# Patient Record
Sex: MALE | Race: WHITE | Employment: OTHER | URBAN - METROPOLITAN AREA
[De-identification: names, ages, dates, MRNs, and addresses within clinical notes are randomized per-mention and may not be internally consistent; named-entity substitution may affect disease eponyms.]

---

## 2017-05-05 ENCOUNTER — HOSPITAL ENCOUNTER (OUTPATIENT)
Dept: CT IMAGING | Age: 62
Discharge: HOME | End: 2017-05-05
Attending: INTERNAL MEDICINE | Admitting: INTERNAL MEDICINE

## 2017-05-05 DIAGNOSIS — R93.5 ABNORMAL ABDOMINAL ULTRASOUND: ICD-10-CM

## 2017-05-05 DIAGNOSIS — R93.5 ABNORMAL FINDINGS ON DIAGNOSTIC IMAGING OF OTHER ABDOMINAL REGIONS, INCLUDING RETROPERITONEUM: ICD-10-CM

## 2017-06-04 PROBLEM — J18.9 PNEUMONIA: Status: ACTIVE | Noted: 2017-06-04

## 2017-09-27 ENCOUNTER — HOSPITAL ENCOUNTER (OUTPATIENT)
Dept: SURGERY | Age: 62
Discharge: HOME | End: 2017-09-27
Attending: INTERNAL MEDICINE | Admitting: INTERNAL MEDICINE

## 2017-09-27 VITALS
BODY MASS INDEX: 31.92 KG/M2 | HEIGHT: 70 IN | HEART RATE: 76 BPM | SYSTOLIC BLOOD PRESSURE: 112 MMHG | WEIGHT: 223 LBS | RESPIRATION RATE: 18 BRPM | DIASTOLIC BLOOD PRESSURE: 74 MMHG | TEMPERATURE: 97.7 F | OXYGEN SATURATION: 94 %

## 2019-04-13 ENCOUNTER — APPOINTMENT (OUTPATIENT)
Dept: GENERAL RADIOLOGY | Age: 64
DRG: 469 | End: 2019-04-13
Payer: MEDICAID

## 2019-04-13 ENCOUNTER — HOSPITAL ENCOUNTER (INPATIENT)
Age: 64
LOS: 7 days | Discharge: NURSING, REHAB OR LONG-TERM CARE | DRG: 469 | End: 2019-04-20
Attending: EMERGENCY MEDICINE | Admitting: FAMILY MEDICINE
Payer: MEDICAID

## 2019-04-13 DIAGNOSIS — N17.9 AKI (ACUTE KIDNEY INJURY) (HCC): ICD-10-CM

## 2019-04-13 DIAGNOSIS — S72.002A CLOSED FRACTURE OF LEFT HIP, INITIAL ENCOUNTER (HCC): Primary | ICD-10-CM

## 2019-04-13 PROBLEM — N39.0 UTI (URINARY TRACT INFECTION): Status: ACTIVE | Noted: 2019-04-13

## 2019-04-13 LAB
ANION GAP SERPL CALCULATED.3IONS-SCNC: 12 MMOL/L (ref 3–16)
APTT: 28.2 SEC (ref 26–36)
BACTERIA: ABNORMAL /HPF
BILIRUBIN URINE: NEGATIVE
BLOOD, URINE: ABNORMAL
BUN BLDV-MCNC: 31 MG/DL (ref 7–20)
CALCIUM SERPL-MCNC: 8.7 MG/DL (ref 8.3–10.6)
CHLORIDE BLD-SCNC: 103 MMOL/L (ref 99–110)
CLARITY: ABNORMAL
CO2: 21 MMOL/L (ref 21–32)
COLOR: YELLOW
COMMENT UA: ABNORMAL
CREAT SERPL-MCNC: 4.2 MG/DL (ref 0.8–1.3)
EPITHELIAL CELLS, UA: ABNORMAL /HPF
GFR AFRICAN AMERICAN: 17
GFR NON-AFRICAN AMERICAN: 14
GLUCOSE BLD-MCNC: 105 MG/DL (ref 70–99)
GLUCOSE URINE: NEGATIVE MG/DL
HCT VFR BLD CALC: 30.8 % (ref 40.5–52.5)
HEMOGLOBIN: 9.9 G/DL (ref 13.5–17.5)
INR BLD: 1.26 (ref 0.86–1.14)
KETONES, URINE: NEGATIVE MG/DL
LACTIC ACID, SEPSIS: 0.7 MMOL/L (ref 0.4–1.9)
LEUKOCYTE ESTERASE, URINE: ABNORMAL
MCH RBC QN AUTO: 28.6 PG (ref 26–34)
MCHC RBC AUTO-ENTMCNC: 32.2 G/DL (ref 31–36)
MCV RBC AUTO: 88.8 FL (ref 80–100)
MICROSCOPIC EXAMINATION: YES
NITRITE, URINE: NEGATIVE
PDW BLD-RTO: 14.4 % (ref 12.4–15.4)
PH UA: 6 (ref 5–8)
PLATELET # BLD: 456 K/UL (ref 135–450)
PMV BLD AUTO: 7 FL (ref 5–10.5)
POTASSIUM REFLEX MAGNESIUM: 3.8 MMOL/L (ref 3.5–5.1)
PRO-BNP: 271 PG/ML (ref 0–124)
PROTEIN UA: 30 MG/DL
PROTHROMBIN TIME: 14.4 SEC (ref 9.8–13)
RBC # BLD: 3.47 M/UL (ref 4.2–5.9)
RBC UA: ABNORMAL /HPF (ref 0–2)
SODIUM BLD-SCNC: 136 MMOL/L (ref 136–145)
SPECIFIC GRAVITY UA: 1.01 (ref 1–1.03)
TROPONIN: <0.01 NG/ML
URINE TYPE: ABNORMAL
UROBILINOGEN, URINE: 0.2 E.U./DL
WBC # BLD: 13 K/UL (ref 4–11)
WBC UA: >100 /HPF (ref 0–5)

## 2019-04-13 PROCEDURE — 93005 ELECTROCARDIOGRAM TRACING: CPT | Performed by: INTERNAL MEDICINE

## 2019-04-13 PROCEDURE — 71045 X-RAY EXAM CHEST 1 VIEW: CPT

## 2019-04-13 PROCEDURE — 6360000002 HC RX W HCPCS: Performed by: EMERGENCY MEDICINE

## 2019-04-13 PROCEDURE — 36415 COLL VENOUS BLD VENIPUNCTURE: CPT

## 2019-04-13 PROCEDURE — 96375 TX/PRO/DX INJ NEW DRUG ADDON: CPT

## 2019-04-13 PROCEDURE — 81001 URINALYSIS AUTO W/SCOPE: CPT

## 2019-04-13 PROCEDURE — 99285 EMERGENCY DEPT VISIT HI MDM: CPT

## 2019-04-13 PROCEDURE — 2580000003 HC RX 258: Performed by: FAMILY MEDICINE

## 2019-04-13 PROCEDURE — 80048 BASIC METABOLIC PNL TOTAL CA: CPT

## 2019-04-13 PROCEDURE — 94760 N-INVAS EAR/PLS OXIMETRY 1: CPT

## 2019-04-13 PROCEDURE — 88305 TISSUE EXAM BY PATHOLOGIST: CPT

## 2019-04-13 PROCEDURE — 1200000000 HC SEMI PRIVATE

## 2019-04-13 PROCEDURE — 83605 ASSAY OF LACTIC ACID: CPT

## 2019-04-13 PROCEDURE — 83880 ASSAY OF NATRIURETIC PEPTIDE: CPT

## 2019-04-13 PROCEDURE — 85027 COMPLETE CBC AUTOMATED: CPT

## 2019-04-13 PROCEDURE — 84484 ASSAY OF TROPONIN QUANT: CPT

## 2019-04-13 PROCEDURE — 85730 THROMBOPLASTIN TIME PARTIAL: CPT

## 2019-04-13 PROCEDURE — 6360000002 HC RX W HCPCS: Performed by: FAMILY MEDICINE

## 2019-04-13 PROCEDURE — 96365 THER/PROPH/DIAG IV INF INIT: CPT

## 2019-04-13 PROCEDURE — 73502 X-RAY EXAM HIP UNI 2-3 VIEWS: CPT

## 2019-04-13 PROCEDURE — 85610 PROTHROMBIN TIME: CPT

## 2019-04-13 PROCEDURE — 6370000000 HC RX 637 (ALT 250 FOR IP): Performed by: FAMILY MEDICINE

## 2019-04-13 PROCEDURE — 88311 DECALCIFY TISSUE: CPT

## 2019-04-13 PROCEDURE — 87040 BLOOD CULTURE FOR BACTERIA: CPT

## 2019-04-13 PROCEDURE — 2580000003 HC RX 258: Performed by: EMERGENCY MEDICINE

## 2019-04-13 RX ORDER — SENNA AND DOCUSATE SODIUM 50; 8.6 MG/1; MG/1
1 TABLET, FILM COATED ORAL 2 TIMES DAILY
Status: DISCONTINUED | OUTPATIENT
Start: 2019-04-13 | End: 2019-04-20 | Stop reason: HOSPADM

## 2019-04-13 RX ORDER — SODIUM CHLORIDE 0.9 % (FLUSH) 0.9 %
10 SYRINGE (ML) INJECTION EVERY 12 HOURS SCHEDULED
Status: DISCONTINUED | OUTPATIENT
Start: 2019-04-13 | End: 2019-04-14 | Stop reason: SDUPTHER

## 2019-04-13 RX ORDER — 0.9 % SODIUM CHLORIDE 0.9 %
1500 INTRAVENOUS SOLUTION INTRAVENOUS ONCE
Status: COMPLETED | OUTPATIENT
Start: 2019-04-13 | End: 2019-04-14

## 2019-04-13 RX ORDER — SODIUM CHLORIDE 0.9 % (FLUSH) 0.9 %
10 SYRINGE (ML) INJECTION PRN
Status: DISCONTINUED | OUTPATIENT
Start: 2019-04-13 | End: 2019-04-14 | Stop reason: SDUPTHER

## 2019-04-13 RX ORDER — FERROUS SULFATE 325(65) MG
325 TABLET ORAL
COMMUNITY

## 2019-04-13 RX ORDER — ONDANSETRON 2 MG/ML
4 INJECTION INTRAMUSCULAR; INTRAVENOUS EVERY 6 HOURS PRN
Status: DISCONTINUED | OUTPATIENT
Start: 2019-04-13 | End: 2019-04-20 | Stop reason: HOSPADM

## 2019-04-13 RX ORDER — ARIPIPRAZOLE 5 MG/1
5 TABLET ORAL 2 TIMES DAILY
Status: DISCONTINUED | OUTPATIENT
Start: 2019-04-13 | End: 2019-04-20 | Stop reason: HOSPADM

## 2019-04-13 RX ORDER — ATORVASTATIN CALCIUM 20 MG/1
20 TABLET, FILM COATED ORAL NIGHTLY
Status: DISCONTINUED | OUTPATIENT
Start: 2019-04-13 | End: 2019-04-13

## 2019-04-13 RX ORDER — ATORVASTATIN CALCIUM 10 MG/1
10 TABLET, FILM COATED ORAL NIGHTLY
Status: DISCONTINUED | OUTPATIENT
Start: 2019-04-13 | End: 2019-04-20 | Stop reason: HOSPADM

## 2019-04-13 RX ORDER — ARIPIPRAZOLE 5 MG/1
5 TABLET ORAL EVERY EVENING
COMMUNITY

## 2019-04-13 RX ORDER — ATORVASTATIN CALCIUM 10 MG/1
10 TABLET, FILM COATED ORAL NIGHTLY
COMMUNITY

## 2019-04-13 RX ORDER — HYDROCODONE BITARTRATE AND ACETAMINOPHEN 5; 325 MG/1; MG/1
1 TABLET ORAL EVERY 4 HOURS PRN
Status: DISCONTINUED | OUTPATIENT
Start: 2019-04-13 | End: 2019-04-14

## 2019-04-13 RX ORDER — SODIUM CHLORIDE 9 MG/ML
INJECTION, SOLUTION INTRAVENOUS CONTINUOUS
Status: DISCONTINUED | OUTPATIENT
Start: 2019-04-13 | End: 2019-04-14 | Stop reason: SDUPTHER

## 2019-04-13 RX ORDER — 0.9 % SODIUM CHLORIDE 0.9 %
1000 INTRAVENOUS SOLUTION INTRAVENOUS ONCE
Status: COMPLETED | OUTPATIENT
Start: 2019-04-13 | End: 2019-04-13

## 2019-04-13 RX ORDER — MULTIVITAMIN WITH FOLIC ACID 400 MCG
1 TABLET ORAL DAILY
Status: DISCONTINUED | OUTPATIENT
Start: 2019-04-14 | End: 2019-04-20 | Stop reason: HOSPADM

## 2019-04-13 RX ORDER — MORPHINE SULFATE 4 MG/ML
4 INJECTION, SOLUTION INTRAMUSCULAR; INTRAVENOUS ONCE
Status: COMPLETED | OUTPATIENT
Start: 2019-04-13 | End: 2019-04-13

## 2019-04-13 RX ORDER — MORPHINE SULFATE 2 MG/ML
2 INJECTION, SOLUTION INTRAMUSCULAR; INTRAVENOUS EVERY 4 HOURS PRN
Status: DISCONTINUED | OUTPATIENT
Start: 2019-04-13 | End: 2019-04-14

## 2019-04-13 RX ORDER — POLYETHYLENE GLYCOL 3350 17 G/17G
17 POWDER, FOR SOLUTION ORAL DAILY PRN
Status: DISCONTINUED | OUTPATIENT
Start: 2019-04-13 | End: 2019-04-20 | Stop reason: HOSPADM

## 2019-04-13 RX ORDER — SODIUM CHLORIDE, SODIUM LACTATE, POTASSIUM CHLORIDE, CALCIUM CHLORIDE 600; 310; 30; 20 MG/100ML; MG/100ML; MG/100ML; MG/100ML
1000 INJECTION, SOLUTION INTRAVENOUS ONCE
Status: COMPLETED | OUTPATIENT
Start: 2019-04-13 | End: 2019-04-13

## 2019-04-13 RX ORDER — UREA 10 %
5 LOTION (ML) TOPICAL NIGHTLY
Status: DISCONTINUED | OUTPATIENT
Start: 2019-04-13 | End: 2019-04-20 | Stop reason: HOSPADM

## 2019-04-13 RX ORDER — CEFAZOLIN SODIUM 2 G/50ML
2 SOLUTION INTRAVENOUS ONCE
Status: DISCONTINUED | OUTPATIENT
Start: 2019-04-14 | End: 2019-04-13

## 2019-04-13 RX ORDER — FERROUS SULFATE 325(65) MG
325 TABLET ORAL
Status: DISCONTINUED | OUTPATIENT
Start: 2019-04-14 | End: 2019-04-20 | Stop reason: HOSPADM

## 2019-04-13 RX ORDER — HYDROCODONE BITARTRATE AND ACETAMINOPHEN 5; 325 MG/1; MG/1
2 TABLET ORAL EVERY 4 HOURS PRN
Status: DISCONTINUED | OUTPATIENT
Start: 2019-04-13 | End: 2019-04-14

## 2019-04-13 RX ORDER — TAMSULOSIN HYDROCHLORIDE 0.4 MG/1
0.8 CAPSULE ORAL NIGHTLY
Status: DISCONTINUED | OUTPATIENT
Start: 2019-04-13 | End: 2019-04-20 | Stop reason: HOSPADM

## 2019-04-13 RX ORDER — ACETAMINOPHEN 325 MG/1
650 TABLET ORAL EVERY 4 HOURS PRN
Status: DISCONTINUED | OUTPATIENT
Start: 2019-04-13 | End: 2019-04-20 | Stop reason: HOSPADM

## 2019-04-13 RX ADMIN — SODIUM CHLORIDE, POTASSIUM CHLORIDE, SODIUM LACTATE AND CALCIUM CHLORIDE 1000 ML: 600; 310; 30; 20 INJECTION, SOLUTION INTRAVENOUS at 13:46

## 2019-04-13 RX ADMIN — CEFEPIME HYDROCHLORIDE 1 G: 1 INJECTION, POWDER, FOR SOLUTION INTRAMUSCULAR; INTRAVENOUS at 23:03

## 2019-04-13 RX ADMIN — SODIUM CHLORIDE 1500 ML: 9 INJECTION, SOLUTION INTRAVENOUS at 22:42

## 2019-04-13 RX ADMIN — HYDROCODONE BITARTRATE AND ACETAMINOPHEN 2 TABLET: 5; 325 TABLET ORAL at 19:33

## 2019-04-13 RX ADMIN — MORPHINE SULFATE 2 MG: 2 INJECTION, SOLUTION INTRAMUSCULAR; INTRAVENOUS at 22:42

## 2019-04-13 RX ADMIN — MORPHINE SULFATE 2 MG: 2 INJECTION, SOLUTION INTRAMUSCULAR; INTRAVENOUS at 17:57

## 2019-04-13 RX ADMIN — ARIPIPRAZOLE 5 MG: 5 TABLET ORAL at 20:31

## 2019-04-13 RX ADMIN — SENNOSIDES, DOCUSATE SODIUM 1 TABLET: 50; 8.6 TABLET, FILM COATED ORAL at 20:31

## 2019-04-13 RX ADMIN — ATORVASTATIN CALCIUM 10 MG: 10 TABLET, FILM COATED ORAL at 20:31

## 2019-04-13 RX ADMIN — SODIUM CHLORIDE: 9 INJECTION, SOLUTION INTRAVENOUS at 19:06

## 2019-04-13 RX ADMIN — DEXTROSE MONOHYDRATE 1 G: 5 INJECTION INTRAVENOUS at 15:58

## 2019-04-13 RX ADMIN — TAMSULOSIN HYDROCHLORIDE 0.8 MG: 0.4 CAPSULE ORAL at 20:31

## 2019-04-13 RX ADMIN — Medication 10 ML: at 23:03

## 2019-04-13 RX ADMIN — SODIUM CHLORIDE 1000 ML: 9 INJECTION, SOLUTION INTRAVENOUS at 17:48

## 2019-04-13 RX ADMIN — MORPHINE SULFATE 4 MG: 4 INJECTION, SOLUTION INTRAMUSCULAR; INTRAVENOUS at 15:58

## 2019-04-13 RX ADMIN — Medication 5 MG: at 20:31

## 2019-04-13 ASSESSMENT — PAIN DESCRIPTION - ORIENTATION
ORIENTATION: LEFT

## 2019-04-13 ASSESSMENT — PAIN DESCRIPTION - DESCRIPTORS
DESCRIPTORS: ACHING
DESCRIPTORS: SHARP
DESCRIPTORS: ACHING
DESCRIPTORS: SHARP

## 2019-04-13 ASSESSMENT — PAIN DESCRIPTION - PAIN TYPE
TYPE: ACUTE PAIN

## 2019-04-13 ASSESSMENT — PAIN DESCRIPTION - LOCATION
LOCATION: HIP

## 2019-04-13 ASSESSMENT — PAIN DESCRIPTION - PROGRESSION
CLINICAL_PROGRESSION: GRADUALLY WORSENING

## 2019-04-13 ASSESSMENT — PAIN DESCRIPTION - ONSET
ONSET: ON-GOING

## 2019-04-13 ASSESSMENT — PAIN DESCRIPTION - FREQUENCY
FREQUENCY: CONTINUOUS
FREQUENCY: INTERMITTENT

## 2019-04-13 ASSESSMENT — PAIN SCALES - GENERAL
PAINLEVEL_OUTOF10: 8
PAINLEVEL_OUTOF10: 7
PAINLEVEL_OUTOF10: 9
PAINLEVEL_OUTOF10: 7
PAINLEVEL_OUTOF10: 8

## 2019-04-13 NOTE — PROGRESS NOTES
Pt admitted to room 5524 from ED. Pt is alert and oriented to person, states he is in the hospital but unsure of which one, disoriented to situation and time. Pt denies nausea, eating dinner and tolerating well. Pt is incontinent of urine, changed depends. Pt c/o severe L hip pain, medicated with 2 mg Morphine per orders and pt request.  NS bolus infusing per orders. Will continue to monitor.

## 2019-04-13 NOTE — H&P
[facility]    4/13/2019 6:17 PM    Patient Information:  Josiah Mace is a 75 y.o. male [id number]  PCP:  Gwendalyn Eisenmenger, MD (Tel: [phone] )    Chief complaint:    Chief Complaint   Patient presents with    Hip Pain        History of Present Illness:  Celestino Kaur is a 75 y.o. male who presents with left hip pain after a near syncopal episode today. He was reportedly walking around at the nursing home and nearly passed out. He was reportedly assisted to the ground but apparently still hit his hip. Patient denies any specific symptoms over the last few days. He denies any shortness of breath chest pain nausea or vomiting. He reports she's been eating fairly well. He reports he ambulates at the nursing home with a walker but then sometimes uses a wheelchair as well. REVIEW OF SYSTEMS:   Constitutional: Negative for fever,chills or night sweats  ENT: Negative for rhinorrhea, epistaxis, hoarseness, sore throat. Respiratory: Negative for shortness of breath,wheezing  Cardiovascular: Negative for chest pain, palpitations   Gastrointestinal: Negative for nausea, vomiting, diarrhea  Genitourinary: Negative for polyuria, dysuria   Hematologic/Lymphatic: Negative for bleeding tendency, easy bruising  Musculoskeletal: Per HPI  Neurologic: Negative for confusion,dysarthria; + near syncope  Skin: Negative for itching,rash  Psychiatric: Negative for depression,anxiety, agitation. Endocrine: Negative for polydipsia,polyuria,heat /cold intolerance.     Past Medical History:   has a past medical history of Encounter for long-term (current) use of other medications, History of colonoscopy with polypectomy, Hypercholesterolemia, Neurodermatitis, Personal history of mental disorder, Personal history of schizophrenia, Pure hypercholesterolemia, Rosacea, Smoker, Special screening for malignant neoplasm of prostate, Tobacco abuse, and Tobacco use disorder. Past Surgical History:   has a past surgical history that includes Foot fracture surgery. Medications:  No current facility-administered medications on file prior to encounter. Current Outpatient Medications on File Prior to Encounter   Medication Sig Dispense Refill    ARIPiprazole (ABILIFY) 5 MG tablet Take 5 mg by mouth 2 times daily      atorvastatin (LIPITOR) 10 MG tablet Take 10 mg by mouth daily      ferrous sulfate 325 (65 Fe) MG tablet Take 325 mg by mouth daily (with breakfast)      Multiple Vitamin (MULTIVITAMIN) tablet Take 1 tablet by mouth daily      senna-docusate (SENEXON-S) 8.6-50 MG per tablet Take 1 tablet by mouth 2 times daily      acetaminophen (TYLENOL) 325 MG tablet Take 650 mg by mouth every 4 hours as needed for Pain      ketoconazole (NIZORAL) 2 % shampoo Apply topically Twice a Week With shower.  tamsulosin (FLOMAX) 0.4 MG capsule Take 0.8 mg by mouth nightly      Cholecalciferol (VITAMIN D3) 11419 UNITS CAPS Take 1 capsule by mouth every 30 days      Melatonin 5 MG CAPS Take 1 capsule by mouth nightly      ARIPiprazole (ABILIFY) 5 MG tablet Take 5 mg by mouth nightly      atorvastatin (LIPITOR) 10 MG tablet Take 10 mg by mouth nightly Take with 20 mg tablet for 30 mg total      atorvastatin (LIPITOR) 20 MG tablet Take 20 mg by mouth nightly Take with 10 mg tablet for 30 mg total         Allergies:  No Known Allergies     Social History:   reports that he has quit smoking. He does not have any smokeless tobacco history on file. He reports that he does not drink alcohol or use drugs. Family History:  Patient cannot relate specific family history    Physical Exam:  /70   Pulse 90   Temp 98.9 °F (37.2 °C) (Oral)   Resp 16   Wt 199 lb 8 oz (90.5 kg)   SpO2 95%   BMI 28.63 kg/m²     General appearance:  Appears comfortable.  Well nourished  Eyes: conjunctiva clear, sclera anicteric  ENT: Moist mucus membranes; poor continue with telemetry monitoring. Symptomatic management of fever with Tylenol. We'll try to avoid nephrotoxic agents given his acute on chronic kidney disease. Orthopedic surgery will be consulted for evaluation and surgical fixation of his hip. I've ordered an EKG for morning. I'll also make him n.p.o. Patient will however be higher risk while he is septic so would recommend waiting for improvement with antibiotics and fluid resuscitation. Admit as inpatient. I anticipate hospitalization spanning more than two midnights for investigation and treatment of the above medically necessary diagnoses.       Alok Peguero MD    4/13/2019 6:17 PM

## 2019-04-13 NOTE — ED PROVIDER NOTES
[address] ENCOUNTER          ATTENDING PHYSICIAN NOTE       Date of evaluation: 4/13/2019    Chief Complaint     Hip Pain      History of Present Illness     Stephanie Mtz is a 75 y.o. male who presents with pain in his left hip after a syncopal or near-syncopal episode today. Patient was reportedly at his nursing home walking around when the staff noticed that he appeared pale and about to lose consciousness. Therefore he was assisted to the ground, no obvious head trauma, it's unclear if the patient actually lost consciousness. He says these felt well, doesn't really remember the event, doesn't believe he had any chest pain or shortness of breath with it, but again can't really say. He notes now that he has pain in his left hip that he doesn't bleed was present from before the event. He denies any shortness of breath, nausea, chest pain, or any symptoms at the moment, or the last several days leading up to this, and says that he has felt pretty well. Denies any headache. Denies neck pain. Reportedly on EMS arrival pt SBP was in the 80's, improved with bolus IVF (500 ml). Review of Systems     Review of Systems  No positives and negatives are listed in HPI; otherwise all systems are reviewed and were negative  Past Medical, Surgical, Family, and Social History     He has a past medical history of Encounter for long-term (current) use of other medications, History of colonoscopy with polypectomy, Hypercholesterolemia, Neurodermatitis, Personal history of mental disorder, Personal history of schizophrenia, Pure hypercholesterolemia, Rosacea, Smoker, Special screening for malignant neoplasm of prostate, Tobacco abuse, and Tobacco use disorder. He has a past surgical history that includes Foot fracture surgery. His family history is not on file. He reports that he has quit smoking. He does not have any smokeless tobacco history on file.  He reports that he does not no pathologic ST changes noted on initial EKG or on review of EMS EKG     RADIOLOGY:  XR CHEST 1 VW   Final Result      No evidence for acute cardiopulmonary disease. XR HIP LEFT (2-3 VIEWS)   Final Result      1.  Acute left femoral neck fracture          LABS:   Results for orders placed or performed during the hospital encounter of 04/13/19   CBC   Result Value Ref Range    WBC 13.0 (H) 4.0 - 11.0 K/uL    RBC 3.47 (L) 4.20 - 5.90 M/uL    Hemoglobin 9.9 (L) 13.5 - 17.5 g/dL    Hematocrit 30.8 (L) 40.5 - 52.5 %    MCV 88.8 80.0 - 100.0 fL    MCH 28.6 26.0 - 34.0 pg    MCHC 32.2 31.0 - 36.0 g/dL    RDW 14.4 12.4 - 15.4 %    Platelets 233 (H) 869 - 450 K/uL    MPV 7.0 5.0 - 10.5 fL   Basic Metabolic Panel w/ Reflex to MG   Result Value Ref Range    Sodium 136 136 - 145 mmol/L    Potassium reflex Magnesium 3.8 3.5 - 5.1 mmol/L    Chloride 103 99 - 110 mmol/L    CO2 21 21 - 32 mmol/L    Anion Gap 12 3 - 16    Glucose 105 (H) 70 - 99 mg/dL    BUN 31 (H) 7 - 20 mg/dL    CREATININE 4.2 (H) 0.8 - 1.3 mg/dL    GFR Non-African American 14 (A) >60    GFR  17 (A) >60    Calcium 8.7 8.3 - 10.6 mg/dL   Troponin   Result Value Ref Range    Troponin <0.01 <0.01 ng/mL   Brain Natriuretic Peptide   Result Value Ref Range    Pro- (H) 0 - 124 pg/mL   Urinalysis, reflex to microscopic   Result Value Ref Range    Color, UA Yellow Straw/Yellow    Clarity, UA CLOUDY (A) Clear    Glucose, Ur Negative Negative mg/dL    Bilirubin Urine Negative Negative    Ketones, Urine Negative Negative mg/dL    Specific Gravity, UA 1.015 1.005 - 1.030    Blood, Urine SMALL (A) Negative    pH, UA 6.0 5.0 - 8.0    Protein, UA 30 (A) Negative mg/dL    Urobilinogen, Urine 0.2 <2.0 E.U./dL    Nitrite, Urine Negative Negative    Leukocyte Esterase, Urine MODERATE (A) Negative    Microscopic Examination YES     Urine Type Voided Urine ba    Protime-INR   Result Value Ref Range    Protime 14.4 (H) 9.8 - 13.0 sec    INR 1.26 (H) 0.86 - 1.14   APTT   Result Value Ref Range    aPTT 28.2 26.0 - 36.0 sec   Microscopic Urinalysis   Result Value Ref Range    WBC, UA >100 (A) 0 - 5 /HPF    RBC, UA 5-10 (A) 0 - 2 /HPF    Epi Cells 0-2 /HPF    Bacteria, UA 3+ (A) /HPF    Urinalysis Comments see below    Lactate, Sepsis   Result Value Ref Range    Lactic Acid, Sepsis 0.7 0.4 - 1.9 mmol/L       ED BEDSIDE ULTRASOUND:      RECENT VITALS:  BP: 104/72,Temp: 99.9 °F (37.7 °C), Pulse: 111, Resp: 16, SpO2: 91 %     Procedures         ED Course     Nursing Notes, Past Medical Hx, Past Surgical Hx, Social Hx,Allergies, and Family Hx were reviewed.     The patient was given the following medications:  Orders Placed This Encounter   Medications    lactated ringers infusion 1,000 mL    cefTRIAXone (ROCEPHIN) 1 g IVPB in 50 mL D5W minibag    morphine injection 4 mg    atorvastatin (LIPITOR) tablet 10 mg    DISCONTD: atorvastatin (LIPITOR) tablet 20 mg    ARIPiprazole (ABILIFY) tablet 5 mg    ferrous sulfate tablet 325 mg    melatonin tablet 5 mg    multivitamin 1 tablet    sennosides-docusate sodium (SENOKOT-S) 8.6-50 MG tablet 1 tablet    tamsulosin (FLOMAX) capsule 0.8 mg    sodium chloride flush 0.9 % injection 10 mL    sodium chloride flush 0.9 % injection 10 mL    ondansetron (ZOFRAN) injection 4 mg    0.9 % sodium chloride infusion    0.9 % sodium chloride bolus    polyethylene glycol (GLYCOLAX) packet 17 g    acetaminophen (TYLENOL) tablet 650 mg    DISCONTD: cefTRIAXone (ROCEPHIN) 1 g IVPB in 50 mL D5W minibag    OR Linked Order Group     HYDROcodone-acetaminophen (NORCO) 5-325 MG per tablet 1 tablet     HYDROcodone-acetaminophen (NORCO) 5-325 MG per tablet 2 tablet    morphine (PF) injection 2 mg    tranexamic acid (CYKLOKAPRON) 1,000 mg in dextrose 5 % 100 mL IVPB    DISCONTD: ceFAZolin (ANCEF) 2 g in dextrose 3 % 50 mL IVPB (duplex)    ceFAZolin (ANCEF) 2 g in dextrose 5 % 50 mL IVPB    sodium chloride flush 0.9 % injection 10

## 2019-04-13 NOTE — ED NOTES
ECG done on arrival and given to Dr. Maynor Dash.      On license of [facility]  04/13/19 1080

## 2019-04-13 NOTE — ED NOTES
Bed: B17-17  Expected date: 4/13/19  Expected time: 12:39 PM  Means of arrival: [place] [place]  Comments:     Kamaljit Bhat  04/13/19 6590

## 2019-04-14 ENCOUNTER — ANESTHESIA EVENT (OUTPATIENT)
Dept: OPERATING ROOM | Age: 64
DRG: 469 | End: 2019-04-14
Payer: MEDICAID

## 2019-04-14 ENCOUNTER — APPOINTMENT (OUTPATIENT)
Dept: GENERAL RADIOLOGY | Age: 64
DRG: 469 | End: 2019-04-14
Payer: MEDICAID

## 2019-04-14 ENCOUNTER — ANESTHESIA (OUTPATIENT)
Dept: OPERATING ROOM | Age: 64
DRG: 469 | End: 2019-04-14
Payer: MEDICAID

## 2019-04-14 VITALS
DIASTOLIC BLOOD PRESSURE: 68 MMHG | SYSTOLIC BLOOD PRESSURE: 112 MMHG | RESPIRATION RATE: 15 BRPM | OXYGEN SATURATION: 99 % | TEMPERATURE: 95 F

## 2019-04-14 LAB
ANION GAP SERPL CALCULATED.3IONS-SCNC: 12 MMOL/L (ref 3–16)
BACTERIA: ABNORMAL /HPF
BILIRUBIN URINE: NEGATIVE
BLOOD, URINE: ABNORMAL
BUN BLDV-MCNC: 30 MG/DL (ref 7–20)
CALCIUM SERPL-MCNC: 8.2 MG/DL (ref 8.3–10.6)
CHLORIDE BLD-SCNC: 109 MMOL/L (ref 99–110)
CLARITY: CLEAR
CO2: 16 MMOL/L (ref 21–32)
COLOR: YELLOW
CREAT SERPL-MCNC: 3.7 MG/DL (ref 0.8–1.3)
EKG ATRIAL RATE: 75 BPM
EKG DIAGNOSIS: NORMAL
EKG P AXIS: 73 DEGREES
EKG P-R INTERVAL: 210 MS
EKG Q-T INTERVAL: 420 MS
EKG QRS DURATION: 92 MS
EKG QTC CALCULATION (BAZETT): 469 MS
EKG R AXIS: 18 DEGREES
EKG T AXIS: 37 DEGREES
EKG VENTRICULAR RATE: 75 BPM
EPITHELIAL CELLS, UA: ABNORMAL /HPF
GFR AFRICAN AMERICAN: 20
GFR NON-AFRICAN AMERICAN: 17
GLUCOSE BLD-MCNC: 121 MG/DL (ref 70–99)
GLUCOSE URINE: NEGATIVE MG/DL
HCT VFR BLD CALC: 27 % (ref 40.5–52.5)
HEMOGLOBIN: 8.9 G/DL (ref 13.5–17.5)
KETONES, URINE: NEGATIVE MG/DL
LACTIC ACID, SEPSIS: 0.6 MMOL/L (ref 0.4–1.9)
LEUKOCYTE ESTERASE, URINE: ABNORMAL
MAGNESIUM: 1.8 MG/DL (ref 1.8–2.4)
MCH RBC QN AUTO: 29 PG (ref 26–34)
MCHC RBC AUTO-ENTMCNC: 32.8 G/DL (ref 31–36)
MCV RBC AUTO: 88.2 FL (ref 80–100)
MICROSCOPIC EXAMINATION: YES
NITRITE, URINE: NEGATIVE
PDW BLD-RTO: 14.6 % (ref 12.4–15.4)
PH UA: 5.5 (ref 5–8)
PLATELET # BLD: 399 K/UL (ref 135–450)
PMV BLD AUTO: 6.9 FL (ref 5–10.5)
POTASSIUM REFLEX MAGNESIUM: 3.9 MMOL/L (ref 3.5–5.1)
PROTEIN UA: 30 MG/DL
RBC # BLD: 3.06 M/UL (ref 4.2–5.9)
RBC UA: ABNORMAL /HPF (ref 0–2)
SODIUM BLD-SCNC: 137 MMOL/L (ref 136–145)
SPECIFIC GRAVITY UA: 1.01 (ref 1–1.03)
URINE TYPE: ABNORMAL
UROBILINOGEN, URINE: 0.2 E.U./DL
WBC # BLD: 13.5 K/UL (ref 4–11)
WBC UA: ABNORMAL /HPF (ref 0–5)

## 2019-04-14 PROCEDURE — 3600000004 HC SURGERY LEVEL 4 BASE: Performed by: ORTHOPAEDIC SURGERY

## 2019-04-14 PROCEDURE — 72170 X-RAY EXAM OF PELVIS: CPT

## 2019-04-14 PROCEDURE — 94761 N-INVAS EAR/PLS OXIMETRY MLT: CPT

## 2019-04-14 PROCEDURE — 2580000003 HC RX 258: Performed by: ORTHOPAEDIC SURGERY

## 2019-04-14 PROCEDURE — 3700000001 HC ADD 15 MINUTES (ANESTHESIA): Performed by: ORTHOPAEDIC SURGERY

## 2019-04-14 PROCEDURE — 2580000003 HC RX 258: Performed by: INTERNAL MEDICINE

## 2019-04-14 PROCEDURE — 87186 SC STD MICRODIL/AGAR DIL: CPT

## 2019-04-14 PROCEDURE — 3209999900 FLUORO FOR SURGICAL PROCEDURES

## 2019-04-14 PROCEDURE — 2580000003 HC RX 258: Performed by: ANESTHESIOLOGY

## 2019-04-14 PROCEDURE — 87086 URINE CULTURE/COLONY COUNT: CPT

## 2019-04-14 PROCEDURE — 1200000000 HC SEMI PRIVATE

## 2019-04-14 PROCEDURE — 3600000014 HC SURGERY LEVEL 4 ADDTL 15MIN: Performed by: ORTHOPAEDIC SURGERY

## 2019-04-14 PROCEDURE — 83735 ASSAY OF MAGNESIUM: CPT

## 2019-04-14 PROCEDURE — 85027 COMPLETE CBC AUTOMATED: CPT

## 2019-04-14 PROCEDURE — 6360000002 HC RX W HCPCS: Performed by: FAMILY MEDICINE

## 2019-04-14 PROCEDURE — 2720000010 HC SURG SUPPLY STERILE: Performed by: ORTHOPAEDIC SURGERY

## 2019-04-14 PROCEDURE — 2500000003 HC RX 250 WO HCPCS: Performed by: ORTHOPAEDIC SURGERY

## 2019-04-14 PROCEDURE — 51798 US URINE CAPACITY MEASURE: CPT

## 2019-04-14 PROCEDURE — 2500000003 HC RX 250 WO HCPCS: Performed by: ANESTHESIOLOGY

## 2019-04-14 PROCEDURE — 3700000000 HC ANESTHESIA ATTENDED CARE: Performed by: ORTHOPAEDIC SURGERY

## 2019-04-14 PROCEDURE — C1769 GUIDE WIRE: HCPCS | Performed by: ORTHOPAEDIC SURGERY

## 2019-04-14 PROCEDURE — 73501 X-RAY EXAM HIP UNI 1 VIEW: CPT

## 2019-04-14 PROCEDURE — 7100000001 HC PACU RECOVERY - ADDTL 15 MIN: Performed by: ORTHOPAEDIC SURGERY

## 2019-04-14 PROCEDURE — 36415 COLL VENOUS BLD VENIPUNCTURE: CPT

## 2019-04-14 PROCEDURE — 6370000000 HC RX 637 (ALT 250 FOR IP): Performed by: ORTHOPAEDIC SURGERY

## 2019-04-14 PROCEDURE — 94664 DEMO&/EVAL PT USE INHALER: CPT

## 2019-04-14 PROCEDURE — 0T7D8ZZ DILATION OF URETHRA, VIA NATURAL OR ARTIFICIAL OPENING ENDOSCOPIC: ICD-10-PCS | Performed by: UROLOGY

## 2019-04-14 PROCEDURE — 0SRB03Z REPLACEMENT OF LEFT HIP JOINT WITH CERAMIC SYNTHETIC SUBSTITUTE, OPEN APPROACH: ICD-10-PCS | Performed by: ORTHOPAEDIC SURGERY

## 2019-04-14 PROCEDURE — 2500000003 HC RX 250 WO HCPCS: Performed by: INTERNAL MEDICINE

## 2019-04-14 PROCEDURE — 6360000002 HC RX W HCPCS: Performed by: ANESTHESIOLOGY

## 2019-04-14 PROCEDURE — 6370000000 HC RX 637 (ALT 250 FOR IP): Performed by: INTERNAL MEDICINE

## 2019-04-14 PROCEDURE — 81001 URINALYSIS AUTO W/SCOPE: CPT

## 2019-04-14 PROCEDURE — 7100000000 HC PACU RECOVERY - FIRST 15 MIN: Performed by: ORTHOPAEDIC SURGERY

## 2019-04-14 PROCEDURE — C1776 JOINT DEVICE (IMPLANTABLE): HCPCS | Performed by: ORTHOPAEDIC SURGERY

## 2019-04-14 PROCEDURE — 87077 CULTURE AEROBIC IDENTIFY: CPT

## 2019-04-14 PROCEDURE — 6370000000 HC RX 637 (ALT 250 FOR IP): Performed by: FAMILY MEDICINE

## 2019-04-14 PROCEDURE — 2580000003 HC RX 258: Performed by: FAMILY MEDICINE

## 2019-04-14 PROCEDURE — 80048 BASIC METABOLIC PNL TOTAL CA: CPT

## 2019-04-14 PROCEDURE — 94150 VITAL CAPACITY TEST: CPT

## 2019-04-14 PROCEDURE — 2709999900 HC NON-CHARGEABLE SUPPLY: Performed by: ORTHOPAEDIC SURGERY

## 2019-04-14 DEVICE — COMPONENT TOT HIP CAPPED ADV CEM POROUS: Type: IMPLANTABLE DEVICE | Site: HIP | Status: FUNCTIONAL

## 2019-04-14 DEVICE — STEM FEM SZ 13 L135MM NK L38.5MM 135DEG 41.5MM OFFSET STD: Type: IMPLANTABLE DEVICE | Site: HIP | Status: FUNCTIONAL

## 2019-04-14 DEVICE — CUP ACET DIA56MM HIP GRIPTION PRI CEMENTLESS FIX SECT SER: Type: IMPLANTABLE DEVICE | Site: HIP | Status: FUNCTIONAL

## 2019-04-14 DEVICE — LINER ACET OD56MM ID36MM HIP ALTRX PINN: Type: IMPLANTABLE DEVICE | Site: HIP | Status: FUNCTIONAL

## 2019-04-14 DEVICE — HEAD FEM DIA36MM +5MM OFFSET 12/14 TAPR HIP CERAMIC BIOLOX: Type: IMPLANTABLE DEVICE | Site: HIP | Status: FUNCTIONAL

## 2019-04-14 RX ORDER — HYDROCODONE BITARTRATE AND ACETAMINOPHEN 5; 325 MG/1; MG/1
1 TABLET ORAL EVERY 4 HOURS PRN
Qty: 42 TABLET | Refills: 0 | Status: SHIPPED | OUTPATIENT
Start: 2019-04-14 | End: 2019-04-21

## 2019-04-14 RX ORDER — MORPHINE SULFATE 2 MG/ML
2 INJECTION, SOLUTION INTRAMUSCULAR; INTRAVENOUS
Status: DISCONTINUED | OUTPATIENT
Start: 2019-04-14 | End: 2019-04-20 | Stop reason: HOSPADM

## 2019-04-14 RX ORDER — SODIUM CHLORIDE 0.9 % (FLUSH) 0.9 %
10 SYRINGE (ML) INJECTION EVERY 12 HOURS SCHEDULED
Status: DISCONTINUED | OUTPATIENT
Start: 2019-04-14 | End: 2019-04-20 | Stop reason: HOSPADM

## 2019-04-14 RX ORDER — LABETALOL HYDROCHLORIDE 5 MG/ML
5 INJECTION, SOLUTION INTRAVENOUS EVERY 10 MIN PRN
Status: DISCONTINUED | OUTPATIENT
Start: 2019-04-14 | End: 2019-04-14 | Stop reason: HOSPADM

## 2019-04-14 RX ORDER — SODIUM CHLORIDE 9 MG/ML
INJECTION, SOLUTION INTRAVENOUS CONTINUOUS
Status: DISCONTINUED | OUTPATIENT
Start: 2019-04-14 | End: 2019-04-14

## 2019-04-14 RX ORDER — CIPROFLOXACIN 2 MG/ML
INJECTION, SOLUTION INTRAVENOUS PRN
Status: DISCONTINUED | OUTPATIENT
Start: 2019-04-14 | End: 2019-04-14 | Stop reason: SDUPTHER

## 2019-04-14 RX ORDER — MAGNESIUM HYDROXIDE 1200 MG/15ML
LIQUID ORAL CONTINUOUS PRN
Status: COMPLETED | OUTPATIENT
Start: 2019-04-14 | End: 2019-04-14

## 2019-04-14 RX ORDER — MEPERIDINE HYDROCHLORIDE 25 MG/ML
12.5 INJECTION INTRAMUSCULAR; INTRAVENOUS; SUBCUTANEOUS EVERY 5 MIN PRN
Status: DISCONTINUED | OUTPATIENT
Start: 2019-04-14 | End: 2019-04-14 | Stop reason: HOSPADM

## 2019-04-14 RX ORDER — MIDAZOLAM HYDROCHLORIDE 1 MG/ML
INJECTION INTRAMUSCULAR; INTRAVENOUS PRN
Status: DISCONTINUED | OUTPATIENT
Start: 2019-04-14 | End: 2019-04-14 | Stop reason: SDUPTHER

## 2019-04-14 RX ORDER — PROPOFOL 10 MG/ML
INJECTION, EMULSION INTRAVENOUS PRN
Status: DISCONTINUED | OUTPATIENT
Start: 2019-04-14 | End: 2019-04-14 | Stop reason: SDUPTHER

## 2019-04-14 RX ORDER — KETAMINE HYDROCHLORIDE 50 MG/ML
INJECTION, SOLUTION, CONCENTRATE INTRAMUSCULAR; INTRAVENOUS PRN
Status: DISCONTINUED | OUTPATIENT
Start: 2019-04-14 | End: 2019-04-14 | Stop reason: SDUPTHER

## 2019-04-14 RX ORDER — DIPHENHYDRAMINE HYDROCHLORIDE 50 MG/ML
12.5 INJECTION INTRAMUSCULAR; INTRAVENOUS
Status: DISCONTINUED | OUTPATIENT
Start: 2019-04-14 | End: 2019-04-14 | Stop reason: HOSPADM

## 2019-04-14 RX ORDER — CEFAZOLIN SODIUM 1 G/3ML
INJECTION, POWDER, FOR SOLUTION INTRAMUSCULAR; INTRAVENOUS PRN
Status: DISCONTINUED | OUTPATIENT
Start: 2019-04-14 | End: 2019-04-14 | Stop reason: SDUPTHER

## 2019-04-14 RX ORDER — MORPHINE SULFATE 2 MG/ML
4 INJECTION, SOLUTION INTRAMUSCULAR; INTRAVENOUS
Status: DISCONTINUED | OUTPATIENT
Start: 2019-04-14 | End: 2019-04-20 | Stop reason: HOSPADM

## 2019-04-14 RX ORDER — SODIUM CHLORIDE 9 MG/ML
INJECTION, SOLUTION INTRAVENOUS CONTINUOUS PRN
Status: DISCONTINUED | OUTPATIENT
Start: 2019-04-14 | End: 2019-04-14 | Stop reason: SDUPTHER

## 2019-04-14 RX ORDER — ACETAMINOPHEN 650 MG/1
650 SUPPOSITORY RECTAL EVERY 4 HOURS PRN
Status: DISCONTINUED | OUTPATIENT
Start: 2019-04-14 | End: 2019-04-20 | Stop reason: HOSPADM

## 2019-04-14 RX ORDER — METOCLOPRAMIDE HYDROCHLORIDE 5 MG/ML
10 INJECTION INTRAMUSCULAR; INTRAVENOUS
Status: DISCONTINUED | OUTPATIENT
Start: 2019-04-14 | End: 2019-04-14 | Stop reason: HOSPADM

## 2019-04-14 RX ORDER — GLYCOPYRROLATE 1 MG/5 ML
SYRINGE (ML) INTRAVENOUS PRN
Status: DISCONTINUED | OUTPATIENT
Start: 2019-04-14 | End: 2019-04-14 | Stop reason: SDUPTHER

## 2019-04-14 RX ORDER — HYDRALAZINE HYDROCHLORIDE 20 MG/ML
5 INJECTION INTRAMUSCULAR; INTRAVENOUS EVERY 10 MIN PRN
Status: DISCONTINUED | OUTPATIENT
Start: 2019-04-14 | End: 2019-04-14 | Stop reason: HOSPADM

## 2019-04-14 RX ORDER — OXYCODONE HYDROCHLORIDE 5 MG/1
5 TABLET ORAL PRN
Status: DISCONTINUED | OUTPATIENT
Start: 2019-04-14 | End: 2019-04-14 | Stop reason: HOSPADM

## 2019-04-14 RX ORDER — PROMETHAZINE HYDROCHLORIDE 25 MG/ML
6.25 INJECTION, SOLUTION INTRAMUSCULAR; INTRAVENOUS
Status: DISCONTINUED | OUTPATIENT
Start: 2019-04-14 | End: 2019-04-14 | Stop reason: HOSPADM

## 2019-04-14 RX ORDER — MORPHINE SULFATE 4 MG/ML
1 INJECTION, SOLUTION INTRAMUSCULAR; INTRAVENOUS EVERY 5 MIN PRN
Status: DISCONTINUED | OUTPATIENT
Start: 2019-04-14 | End: 2019-04-14 | Stop reason: HOSPADM

## 2019-04-14 RX ORDER — DOCUSATE SODIUM 100 MG/1
100 CAPSULE, LIQUID FILLED ORAL 2 TIMES DAILY
Status: DISCONTINUED | OUTPATIENT
Start: 2019-04-14 | End: 2019-04-20 | Stop reason: HOSPADM

## 2019-04-14 RX ORDER — SODIUM CHLORIDE 0.9 % (FLUSH) 0.9 %
10 SYRINGE (ML) INJECTION PRN
Status: DISCONTINUED | OUTPATIENT
Start: 2019-04-14 | End: 2019-04-20 | Stop reason: HOSPADM

## 2019-04-14 RX ORDER — HYDROCODONE BITARTRATE AND ACETAMINOPHEN 10; 325 MG/1; MG/1
1 TABLET ORAL EVERY 4 HOURS PRN
Status: DISCONTINUED | OUTPATIENT
Start: 2019-04-14 | End: 2019-04-20 | Stop reason: HOSPADM

## 2019-04-14 RX ORDER — OXYCODONE HYDROCHLORIDE 5 MG/1
10 TABLET ORAL PRN
Status: DISCONTINUED | OUTPATIENT
Start: 2019-04-14 | End: 2019-04-14 | Stop reason: HOSPADM

## 2019-04-14 RX ORDER — HYDROCODONE BITARTRATE AND ACETAMINOPHEN 10; 325 MG/1; MG/1
2 TABLET ORAL EVERY 4 HOURS PRN
Status: DISCONTINUED | OUTPATIENT
Start: 2019-04-14 | End: 2019-04-20 | Stop reason: HOSPADM

## 2019-04-14 RX ADMIN — TRANEXAMIC ACID 1000 MG: 1 INJECTION, SOLUTION INTRAVENOUS at 14:00

## 2019-04-14 RX ADMIN — ARIPIPRAZOLE 5 MG: 5 TABLET ORAL at 10:39

## 2019-04-14 RX ADMIN — PROPOFOL 150 MG: 10 INJECTION, EMULSION INTRAVENOUS at 14:15

## 2019-04-14 RX ADMIN — KETAMINE HYDROCHLORIDE 50 MG: 50 INJECTION, SOLUTION INTRAMUSCULAR; INTRAVENOUS at 12:40

## 2019-04-14 RX ADMIN — SODIUM CHLORIDE: 9 INJECTION, SOLUTION INTRAVENOUS at 00:30

## 2019-04-14 RX ADMIN — ACETAMINOPHEN 650 MG: 650 SUPPOSITORY RECTAL at 12:12

## 2019-04-14 RX ADMIN — SODIUM CHLORIDE: 9 INJECTION, SOLUTION INTRAVENOUS at 09:48

## 2019-04-14 RX ADMIN — CEFAZOLIN SODIUM 1000 MG: 1 POWDER, FOR SOLUTION INTRAMUSCULAR; INTRAVENOUS at 14:05

## 2019-04-14 RX ADMIN — CEFEPIME HYDROCHLORIDE 1 G: 1 INJECTION, POWDER, FOR SOLUTION INTRAMUSCULAR; INTRAVENOUS at 22:41

## 2019-04-14 RX ADMIN — ONDANSETRON 4 MG: 2 INJECTION INTRAMUSCULAR; INTRAVENOUS at 20:37

## 2019-04-14 RX ADMIN — SODIUM CHLORIDE: 900 INJECTION, SOLUTION INTRAVENOUS at 12:37

## 2019-04-14 RX ADMIN — SODIUM CHLORIDE: 9 INJECTION, SOLUTION INTRAVENOUS at 18:04

## 2019-04-14 RX ADMIN — MORPHINE SULFATE 2 MG: 2 INJECTION, SOLUTION INTRAMUSCULAR; INTRAVENOUS at 03:20

## 2019-04-14 RX ADMIN — Medication 5 MG: at 20:28

## 2019-04-14 RX ADMIN — ATORVASTATIN CALCIUM 10 MG: 10 TABLET, FILM COATED ORAL at 20:28

## 2019-04-14 RX ADMIN — MIDAZOLAM HYDROCHLORIDE 2 MG: 2 INJECTION, SOLUTION INTRAMUSCULAR; INTRAVENOUS at 12:42

## 2019-04-14 RX ADMIN — TAMSULOSIN HYDROCHLORIDE 0.8 MG: 0.4 CAPSULE ORAL at 20:28

## 2019-04-14 RX ADMIN — Medication 10 ML: at 09:59

## 2019-04-14 RX ADMIN — HYDROCODONE BITARTRATE AND ACETAMINOPHEN 2 TABLET: 10; 325 TABLET ORAL at 20:28

## 2019-04-14 RX ADMIN — DOCUSATE SODIUM 100 MG: 100 CAPSULE, LIQUID FILLED ORAL at 20:28

## 2019-04-14 RX ADMIN — SENNOSIDES, DOCUSATE SODIUM 1 TABLET: 50; 8.6 TABLET, FILM COATED ORAL at 20:28

## 2019-04-14 RX ADMIN — Medication 0.2 MG: at 12:40

## 2019-04-14 RX ADMIN — SODIUM BICARBONATE: 84 INJECTION, SOLUTION INTRAVENOUS at 22:35

## 2019-04-14 RX ADMIN — CIPROFLOXACIN 400 MG: 2 INJECTION, SOLUTION INTRAVENOUS at 14:11

## 2019-04-14 RX ADMIN — ARIPIPRAZOLE 5 MG: 5 TABLET ORAL at 20:27

## 2019-04-14 ASSESSMENT — PULMONARY FUNCTION TESTS
PIF_VALUE: 0
PIF_VALUE: 4
PIF_VALUE: 0
PIF_VALUE: 0
PIF_VALUE: 6
PIF_VALUE: 0
PIF_VALUE: 5
PIF_VALUE: 4
PIF_VALUE: 0
PIF_VALUE: 4
PIF_VALUE: 0
PIF_VALUE: 5
PIF_VALUE: 1
PIF_VALUE: 6
PIF_VALUE: 0
PIF_VALUE: 4
PIF_VALUE: 4
PIF_VALUE: 5
PIF_VALUE: 0
PIF_VALUE: 0
PIF_VALUE: 4
PIF_VALUE: 0
PIF_VALUE: 4
PIF_VALUE: 4
PIF_VALUE: 5
PIF_VALUE: 0
PIF_VALUE: 5
PIF_VALUE: 0
PIF_VALUE: 4
PIF_VALUE: 0
PIF_VALUE: 0
PIF_VALUE: 4
PIF_VALUE: 4
PIF_VALUE: 0
PIF_VALUE: 6
PIF_VALUE: 0
PIF_VALUE: 4
PIF_VALUE: 5
PIF_VALUE: 4
PIF_VALUE: 1
PIF_VALUE: 0
PIF_VALUE: 3
PIF_VALUE: 1
PIF_VALUE: 4
PIF_VALUE: 5
PIF_VALUE: 0
PIF_VALUE: 3
PIF_VALUE: 0
PIF_VALUE: 5
PIF_VALUE: 0
PIF_VALUE: 4
PIF_VALUE: 0
PIF_VALUE: 4
PIF_VALUE: 0
PIF_VALUE: 5
PIF_VALUE: 1
PIF_VALUE: 0
PIF_VALUE: 6
PIF_VALUE: 4
PIF_VALUE: 5
PIF_VALUE: 0
PIF_VALUE: 4
PIF_VALUE: 1
PIF_VALUE: 0
PIF_VALUE: 30
PIF_VALUE: 7
PIF_VALUE: 3
PIF_VALUE: 0
PIF_VALUE: 0
PIF_VALUE: 1
PIF_VALUE: 0
PIF_VALUE: 0
PIF_VALUE: 6
PIF_VALUE: 1
PIF_VALUE: 0
PIF_VALUE: 0
PIF_VALUE: 6
PIF_VALUE: 3
PIF_VALUE: 5
PIF_VALUE: 0
PIF_VALUE: 4
PIF_VALUE: 5
PIF_VALUE: 0
PIF_VALUE: 0
PIF_VALUE: 6
PIF_VALUE: 3
PIF_VALUE: 4
PIF_VALUE: 0
PIF_VALUE: 5
PIF_VALUE: 0
PIF_VALUE: 6
PIF_VALUE: 3
PIF_VALUE: 2
PIF_VALUE: 0
PIF_VALUE: 4
PIF_VALUE: 5
PIF_VALUE: 8
PIF_VALUE: 0
PIF_VALUE: 4
PIF_VALUE: 0
PIF_VALUE: 4
PIF_VALUE: 3
PIF_VALUE: 0
PIF_VALUE: 6
PIF_VALUE: 0
PIF_VALUE: 0
PIF_VALUE: 5
PIF_VALUE: 0
PIF_VALUE: 5
PIF_VALUE: 19
PIF_VALUE: 0
PIF_VALUE: 3
PIF_VALUE: 0
PIF_VALUE: 4
PIF_VALUE: 0
PIF_VALUE: 3
PIF_VALUE: 0
PIF_VALUE: 3
PIF_VALUE: 4
PIF_VALUE: 0
PIF_VALUE: 4
PIF_VALUE: 0
PIF_VALUE: 5
PIF_VALUE: 5
PIF_VALUE: 0
PIF_VALUE: 0
PIF_VALUE: 4
PIF_VALUE: 31
PIF_VALUE: 0
PIF_VALUE: 4
PIF_VALUE: 4
PIF_VALUE: 3
PIF_VALUE: 24
PIF_VALUE: 1
PIF_VALUE: 4
PIF_VALUE: 0
PIF_VALUE: 6
PIF_VALUE: 0
PIF_VALUE: 7
PIF_VALUE: 5
PIF_VALUE: 4
PIF_VALUE: 4
PIF_VALUE: 5
PIF_VALUE: 0
PIF_VALUE: 5
PIF_VALUE: 4
PIF_VALUE: 0
PIF_VALUE: 0

## 2019-04-14 ASSESSMENT — PAIN SCALES - GENERAL
PAINLEVEL_OUTOF10: 0
PAINLEVEL_OUTOF10: 7
PAINLEVEL_OUTOF10: 7
PAINLEVEL_OUTOF10: 0
PAINLEVEL_OUTOF10: 0
PAINLEVEL_OUTOF10: 3

## 2019-04-14 ASSESSMENT — PAIN DESCRIPTION - ORIENTATION
ORIENTATION: LEFT
ORIENTATION: LEFT

## 2019-04-14 ASSESSMENT — PAIN DESCRIPTION - DESCRIPTORS
DESCRIPTORS: ACHING
DESCRIPTORS: ACHING

## 2019-04-14 ASSESSMENT — PAIN DESCRIPTION - ONSET
ONSET: ON-GOING
ONSET: ON-GOING

## 2019-04-14 ASSESSMENT — PAIN DESCRIPTION - PROGRESSION
CLINICAL_PROGRESSION: NOT CHANGED
CLINICAL_PROGRESSION: NOT CHANGED

## 2019-04-14 ASSESSMENT — PAIN DESCRIPTION - PAIN TYPE
TYPE: SURGICAL PAIN
TYPE: ACUTE PAIN

## 2019-04-14 ASSESSMENT — PAIN DESCRIPTION - LOCATION
LOCATION: HIP
LOCATION: HIP

## 2019-04-14 ASSESSMENT — PAIN DESCRIPTION - FREQUENCY
FREQUENCY: CONTINUOUS
FREQUENCY: CONTINUOUS

## 2019-04-14 NOTE — PROGRESS NOTES
Patient admitted to PACU #13 from OR per bed at 1546 s/p  LEFT TOTAL HIP ARTHROPLASTY (Left Hip)   CYSTOSCOPY, URETHRAL DILATION. Report received at bedside in PACU per Dr. BENAVIDESMIUMM [facility]. Patient was reported to be hemodynamically stable during surgery with no complications reported. Patient received a spinal along with general anesthesia. Lee placed in OR with difficulty, see physician note. Patient arrived to PACU responsive but not fully wakeful from anesthesia but with respirations easy, even and regular. No complaints of pain noted or verbalized upon arrival to PACU. Left hip surgical dressing remains C,D,I with prevena wound vac in place. Ice pack applied. No further changes noted. Will continue to monitor.

## 2019-04-14 NOTE — PROGRESS NOTES
Pharmacy Renal Dose Adjustment    Cefepime 2g IV q 12h for UTI/sepsis ordered for patient. This medication is renally eliminated. Will change to 1g IV q 24h per renal dose adjustment policy. Estimated Creatinine Clearance: 20 mL/min (A) (based on SCr of 4.2 mg/dL (H)). Recommended dose is 2g q 12h if CrCl > 60 ml/min,  2g q 24h if CrCl 30-60 ml/min, or 1g q 24h if CrCl 11-29 ml/min. Pharmacy will continue to monitor renal function daily and adjust dose as necessary. Please call with any questions.     Nadia Morris [facility]   4/13/2019 10:54 PM

## 2019-04-14 NOTE — ANESTHESIA PRE PROCEDURE
Department of Anesthesiology  Preprocedure Note       Name:  Jackie Shi   Age:  75 y.o.  :  1955                                          MRN:  [id number]         Date:  2019      Surgeon: Ernst Postal):  Edvin Tanner MD    Procedure: LEFT HIP HEMIARTHROPLASTY (Left )    Medications prior to admission:   Prior to Admission medications    Medication Sig Start Date End Date Taking? Authorizing Provider   HYDROcodone-acetaminophen (NORCO) 5-325 MG per tablet Take 1 tablet by mouth every 4 hours as needed for Pain for up to 7 days. 19 Yes Edvin Tanner MD   apixaban Jayashree Kern) 2.5 MG TABS tablet Take 1 tablet by mouth 2 times daily 19  Yes Edvin Tanner MD   ARIPiprazole (ABILIFY) 5 MG tablet Take 5 mg by mouth 2 times daily   Yes Historical Provider, MD   atorvastatin (LIPITOR) 10 MG tablet Take 10 mg by mouth nightly    Yes Historical Provider, MD   ferrous sulfate 325 (65 Fe) MG tablet Take 325 mg by mouth daily (with breakfast)   Yes Historical Provider, MD   Multiple Vitamin (MULTIVITAMIN) tablet Take 1 tablet by mouth daily   Yes Historical Provider, MD   senna-docusate (SENEXON-S) 8.6-50 MG per tablet Take 1 tablet by mouth 2 times daily   Yes Historical Provider, MD   ketoconazole (NIZORAL) 2 % shampoo Apply topically Twice a Week With shower.    Yes Historical Provider, MD   tamsulosin (FLOMAX) 0.4 MG capsule Take 0.8 mg by mouth nightly   Yes Historical Provider, MD   Cholecalciferol (VITAMIN D3) 76448 UNITS CAPS Take 1 capsule by mouth every 30 days   Yes Historical Provider, MD   Melatonin 5 MG CAPS Take 1 capsule by mouth nightly   Yes Historical Provider, MD   acetaminophen (TYLENOL) 325 MG tablet Take 650 mg by mouth every 4 hours as needed for Pain    Historical Provider, MD       Current medications:    Current Facility-Administered Medications   Medication Dose Route Frequency Provider Last Rate Last Dose    tranexamic acid (CYKLOKAPRON) 1,000 mg in sodium chloride 2031    tamsulosin (FLOMAX) capsule 0.8 mg  0.8 mg Oral Nightly Karsten Ragsdale MD   0.8 mg at 04/13/19 2031    sodium chloride flush 0.9 % injection 10 mL  10 mL Intravenous 2 times per day Karsten Ragsdale MD        sodium chloride flush 0.9 % injection 10 mL  10 mL Intravenous PRN Karsten Ragsdale MD        ondansetron [facility]) injection 4 mg  4 mg Intravenous Q6H PRN Karsten Ragsdale MD        0.9 % sodium chloride infusion   Intravenous Continuous Patel Penn MD 75 mL/hr at 04/14/19 0948      polyethylene glycol (GLYCOLAX) packet 17 g  17 g Oral Daily PRN Karsten Ragsdale MD        acetaminophen (TYLENOL) tablet 650 mg  650 mg Oral Q4H PRN Karsten Ragsdale MD        HYDROcodone-acetaminophen [facility]) 5-325 MG per tablet 1 tablet  1 tablet Oral Q4H PRN Karsten Ragsdale MD        Or    HYDROcodone-acetaminophen [facility]) 5-325 MG per tablet 2 tablet  2 tablet Oral Q4H PRN Karsten Ragsdale MD   2 tablet at 04/13/19 1933    morphine (PF) injection 2 mg  2 mg Intravenous Q4H PRN Karsten Ragsdale MD   2 mg at 04/14/19 0320    ceFAZolin (ANCEF) 2 g in dextrose 5 % 50 mL IVPB  2 g Intravenous On Call to Kelsea Sawyer MD        sodium chloride flush 0.9 % injection 10 mL  10 mL Intravenous 2 times per day Karsten Ragsdale MD   10 mL at 04/14/19 0959    sodium chloride flush 0.9 % injection 10 mL  10 mL Intravenous PRN Karsten Ragsdale MD        cefepime (MAXIPIME) 1 g IVPB minibag  1 g Intravenous Q24H Karsten Ragsdale MD   Stopped at 04/14/19 0014       Allergies:  No Known Allergies    Problem List:    Patient Active Problem List   Diagnosis Code    Pure hypercholesterolemia E78.00    Tobacco use disorder F17.200    Personal history of schizophrenia Z86.59    Rosacea L71.9    Pneumonia J18.9    [facility] S70.56A    UTI (urinary tract infection) N39.0    LIANA (acute kidney injury) ([facility] N17.9       Past Medical History: Diagnosis Date    Encounter for long-term (current) use of other medications     History of colonoscopy with polypectomy 6/06    Hypercholesterolemia     Neurodermatitis     Personal history of mental disorder     Schizophrenia    Personal history of schizophrenia 7/26/2010    Pure hypercholesterolemia 7/26/2010    Rosacea     acne    Smoker     Special screening for malignant neoplasm of prostate     Tobacco abuse     Tobacco use disorder 7/26/2010       Past Surgical History:        Procedure Laterality Date    FOOT FRACTURE SURGERY      repair       Social History:    Social History     Tobacco Use    Smoking status: Former Smoker   Substance Use Topics    Alcohol use: No                                Counseling given: Not Answered      Vital Signs (Current):   Vitals:    04/13/19 2245 04/14/19 0300 04/14/19 0600 04/14/19 1142   BP: 104/72 112/72 129/70 114/69   Pulse: 111 99 106 104   Resp: 16 16 16 17   Temp: 99.9 °F (37.7 °C) 97.4 °F (36.3 °C) 100.3 °F (37.9 °C) 100.7 °F (38.2 °C)   TempSrc: Oral Oral Oral Oral   SpO2: 91% 94% 94% 94%   Weight:   214 lb 8 oz (97.3 kg)    Height:                                                  BP Readings from Last 3 Encounters:   04/14/19 114/69   09/27/17 112/74   06/06/17 (!) 152/104       NPO Status: Time of last liquid consumption: 0001                        Time of last solid consumption: 1900                        Date of last liquid consumption: 04/14/19                        Date of last solid food consumption: 04/13/19    BMI:   Wt Readings from Last 3 Encounters:   04/14/19 214 lb 8 oz (97.3 kg)   09/27/17 223 lb (101.2 kg)   06/03/17 239 lb 13.8 oz (108.8 kg)     Body mass index is 30.78 kg/m².     CBC:   Lab Results   Component Value Date    WBC 13.5 04/14/2019    RBC 3.06 04/14/2019    HGB 8.9 04/14/2019    HCT 27.0 04/14/2019    MCV 88.2 04/14/2019    RDW 14.6 04/14/2019     04/14/2019       CMP:   Lab Results   Component Value Date

## 2019-04-14 NOTE — PROGRESS NOTES
Report called to Arpita Gar RN receiving patient on 5 tower via telephone at 36. Arpita Gar informed of patient receiving spinal anesthesia along with general. No family with patient today when personally check. No further changes.

## 2019-04-14 NOTE — ANESTHESIA PROCEDURE NOTES
Spinal Block    Patient location during procedure: OR  Start time: 4/14/2019 12:40 PM  End time: 4/14/2019 12:48 PM  Reason for block: primary anesthetic  Staffing  Anesthesiologist: Devaughn Mcmillan MD  Performed: anesthesiologist   Spinal Block  Patient position: left lateral decubitus  Prep: Betadine  Patient monitoring: cardiac monitor, continuous pulse ox, frequent blood pressure checks and continuous capnometry  Approach: midline  Location: L4/L5  Provider prep: mask and sterile gloves  Local infiltration: lidocaine  Agent: bupivacaine  Dose: 1.6  Dose: 1.6  Needle  Needle type: Sprotte Tip   Needle gauge: 25 G  Needle length: 3.5 in  Kit: Medline  Lot number: [id number]  Expiration date: 6/30/2020  Assessment  Sensory level: T10  Swirl obtained: Yes  CSF: clear  Attempts: 1  Hemodynamics: stable

## 2019-04-14 NOTE — ANESTHESIA POSTPROCEDURE EVALUATION
Department of Anesthesiology  Postprocedure Note    Patient: Rm Luo  MRN: [id number]  YOB: 1955  Date of evaluation: 4/14/2019  Time:  3:50 PM     Procedure Summary     Date:  04/14/19 Room / Location:  [facility] OR 11 / [facility] OR    Anesthesia Start:  0176 Anesthesia Stop:  1550    Procedures:       LEFT TOTAL HIP ARTHROPLASTY (Left Hip)      CYSTOSCOPY, URETHRAL DILATION Diagnosis:  (LEFT FEMORAL NECK FRACTURE)    Surgeon:  Caitlin Masters MD; Sona Mcdowell MD Responsible Provider:  Samantha Mueller MD    Anesthesia Type:  spinal ASA Status:  3          Anesthesia Type: spinal    She Phase I:      She Phase II:      Last vitals: Reviewed and per EMR flowsheets.        Anesthesia Post Evaluation    Patient location during evaluation: PACU  Patient participation: complete - patient participated  Level of consciousness: awake and alert  Pain score: 0  Airway patency: patent  Nausea & Vomiting: no nausea and no vomiting  Complications: no  Cardiovascular status: hemodynamically stable  Respiratory status: acceptable  Hydration status: euvolemic

## 2019-04-14 NOTE — PLAN OF CARE
Patient has had no complaints during shift before surgery and no complaints of pain since returning from surgery. Will continue to monitor.

## 2019-04-14 NOTE — OP NOTE
PREOPERATIVE DIAGNOSIS: Left hip displaced femoral neck fracture.     POSTOPERATIVE DIAGNOSIS:  Left hip displaced femoral neck fracture     OPERATION PERFORMED: Left total hip arthroplasty, direct anterior supine under  fluoroscopic guidance.     ATTENDING SURGEON: Tyronne Libman, MD     ANESTHESIA: General (Failed Spinal) plus ortho cocktail mixture.     ESTIMATED BLOOD LOSS:  mL.     INTRAVENOUS FLUIDS:  mL crystalloid.     URINE OUTPUT: 0.    ANTIBIOTICS: 3G Cefazolin + 1G Ciprofloxacin (likely UTI after cystoscopy required placement of catheter)     COMPLICATIONS: None.     IMPLANTS:   1. DePuy Hendrix Gription 56 mm outer diameter acetabular shell. 2. Medical AltrX polyethylene acetabular liner, 36 mm inner diameter neutral.  3. DePuy Corail femoral stem 13 standard with collar. 4. Biolox delta ceramic femoral head 36 mm outer diameter +5 offset     SPECIMENS: Femoral head to pathology    OPERATIVE INDICATIONS: The patient is status post low energy mechanical fall. Radiographs demonstrated a displaced femoral neck fracture. Based on their age, I have offered a total hip arthroplasty for treatment. The risks and benefits of total hip arthoplasty were explained in full to the patient. Risk include but are not limited to pain, scar, venothromboembolic disease, neurovascular injury, leg length discrepency, fracture, infection requring additional surgery, medical complication, risks of anesthesia and even death. We specifically discussed dislocation risk after total hip for femoral neck fracture. Despite these risks the patient and their family has elected to proceed with total hip arthroplasty.     OPERATIVE DETAILS: The patient was greeted in the preoperative holding area. The operative hip was marked with a marker. They were brought to the operating room  where general anesthesia was obtained.  They were placed on the [place] table with feet in the boots and appropriately positioned against the post. The arms  were placed on the arm boards. All bony prominences were well padded. The anterior portion of the hip was prepped and draped in normal standard sterile surgical fashion. A final timeout was performed, verifying correct patient, operative site and  operative plan. The patient did receive antibiotics prior to surgical incision. They also received 1 g of tranexamic acid prior to surgical incision.     I began by making an incision just lateral and distal to the anterior superior  iliac spine heading just laterally towards the lateral patellar. This was made through the skin through the subcutaneous  tissue, identifying the fascia overlying the tensor fascia manisha. The fascia  was divided in line with its fibers. I placed an Allis clamp on the fascia  anteriorly and dissected over top of the tensor fascia manisha, meeting the  sartorial fascia and then diving over top of the femoral neck. A cobra  retractor was placed medially over the neck and then laterally over the neck. I brought x-ray in, verified my position. I then divided through the fascia  overlying the anterior hip, coagulating all bleeders including the circumflex  vessel. I made a capsulotomy. I placed my cobra retractors  intraarticularly, then placed a Bovie lesvia on the femoral neck for my planned  femoral osteotomy, placed the saw in place at the Bovie lesvia, and brought  fluoroscopy in and verified my position. I made my osteotomy and then using a  corkscrew, was able to remove the fractured femoral head. At this point, I externally  rotated the femur, dropping it into extension and adduction, and performed a  release of pubofemoral ligament inferiorly down to the lesser trochanter. I  brought it back up into a neutral position and I placed 2 retractors  anteriorly and posteriorly for acetabular reaming. Under direct fluoroscopic  guidance, I reamed sequentially to a size 55 and then opened a 56 mm shell and impacted it into place.  I grabbed this with a Kocher clamp and was able to clearly  demonstrate that it had excellent fixation and I did not feel it needed screw  fixation. A 36 mm inner diameter liner was opened and impacted into place. I  was pleased with this and I turned my attention to the femur. I placed the hook for the [place] table just superior to the vastus lateralis underneath the  femur, and then elevated it and clamped it into the table. A retractor was  placed over the medial femur, the leg was brought into once again extension  and adduction, and full external rotation to approximately 110 degrees. I was  able to expose the femur. I then released the capsule superiorly in order to  get full femoral exposure, I used the box osteotome, followed by canal finder  and placed a broach into the femur and brought the femur back  up removing my retractors and took a fluoroscopic image to verify that I was  within the canal. I was pleased with this. I reset my exposure and then I  broached with sequentially to a size 13 stem. I calcar planed and had a nice flat cut. I then came down, I trialed this under fluoroscopic guidance. I had good leg length and I opened  the KA 13 stem. I reset my retraction and my position of my femur. I impacted  the stem to the level of my femoral neck osteotomy. After trialing once again I opened the size 36 +5 head. This was impacted onto a dry [place]  taper. I reduced the hip, took fluoroscopic images of the bilateral hips and  the pelvis to verify my leg length. I was pleased with leg length and offset. I dropped the hip into extension and external rotation and it did not  dislocate on the table. I was pleased with this and brought it back up into  neutral, took all traction off. The wound was soaked in a dilute betadine solution followed by pulsatile irrigation with 3 L of sterile saline with bacitracin. I then turned my attention to closure of the wound.   The fascia of the TFL was closed with interrupted 0 vicryl oversewn with a running #2 stratafix suture. A local anesthetic mixture was injected into the muscle, deep and subcutaneous tissue followed by 0 vicryl in the subcutaneous tissue. 2-0 vicryl was placed in a buried interrupted fashion followed by skin staples and a negative pressure wound therapy device. The patient was extubated, transferred to a hospital bed and transported to the post-operative anesthesia care unit in stable condition.     All sponge and needle counts were correct x 2.

## 2019-04-14 NOTE — PROGRESS NOTES
Pt is alert and oriented to self. HR and temp elevated this shift but has resolved with Norco. Pt has been NPO since midnight. Pain is controlled with morphine and Nocro. Pt is voiding adequately and is incontinent of urine. All fall precautions in place. Will continue to monitor.

## 2019-04-14 NOTE — PROGRESS NOTES
Hospitalist Progress Note    PCP: Sonam Schneider MD    Date of Admission: 4/13/2019  Hospital Day: 1      Chief Complaint:   Chief Complaint   Patient presents with    Hip Pain           Hospital Course:    Admitted after a near syncopal episode and fall with left hip fracture and found to have urinary tract infection as well as urinary retention    Subjective:   Patient seen and examined  Denies any active complaints. Ancillary notes reviewed    Medications:  Reviewed    Infusion Medications    sodium chloride      sodium chloride      sodium chloride       Scheduled Medications    atorvastatin  10 mg Oral Nightly    ARIPiprazole  5 mg Oral BID    ferrous sulfate  325 mg Oral Daily with breakfast    melatonin  5 mg Oral Nightly    multivitamin  1 tablet Oral Daily    sennosides-docusate sodium  1 tablet Oral BID    tamsulosin  0.8 mg Oral Nightly    sodium chloride flush  10 mL Intravenous 2 times per day    sodium chloride flush  10 mL Intravenous 2 times per day    cefepime  1 g Intravenous Q24H     PRN Meds: acetaminophen, HYDROmorphone, HYDROmorphone, morphine, HYDROmorphone, oxyCODONE **OR** oxyCODONE, diphenhydrAMINE, metoclopramide, promethazine, labetalol, hydrALAZINE, meperidine, sodium chloride, sodium chloride, sodium chloride flush, ondansetron, polyethylene glycol, acetaminophen, HYDROcodone 5 mg - acetaminophen **OR** HYDROcodone 5 mg - acetaminophen, morphine, sodium chloride flush      Intake/Output Summary (Last 24 hours) at 4/14/2019 1544  Last data filed at 4/14/2019 1542  Gross per 24 hour   Intake 750 ml   Output 1300 ml   Net -550 ml       Exam:    /69   Pulse 104   Temp 100.7 °F (38.2 °C) (Oral)   Resp 17   Ht 5' 10\" (1.778 m)   Wt 214 lb 8 oz (97.3 kg)   SpO2 94%   BMI 30.78 kg/m²       General appearance: No apparent distress, appears stated age and cooperative. HEENT: Pupils equal, round, and reactive to light. Conjunctivae/corneas clear.   Neck: Supple, with full range of motion. No jugular venous distention. Trachea midline. Respiratory:  Normal respiratory effort. Clear to auscultation, bilaterally without Rales/Wheezes/Rhonchi. Cardiovascular: Regular rate and rhythm with normal S1/S2 without murmurs, rubs or gallops. Abdomen: Soft, non-tender, non-distended with normal bowel sounds. Musculoskeletal: No clubbing, cyanosis or edema bilaterally. Full range of motion without deformity. Skin: Skin color, texture, turgor normal.  No rashes or lesions. Neurologic:  Neurovascularly intact without any focal sensory/motor deficits. Cranial nerves: II-XII intact, grossly non-focal.  Psychiatric: Alert and oriented, thought content appropriate, normal insight  Capillary Refill: Brisk,< 3 seconds   Peripheral Pulses: +2 palpable, equal bilaterally       Labs:   Recent Labs     04/13/19  1313 04/14/19  0522   WBC 13.0* 13.5*   HGB 9.9* 8.9*   HCT 30.8* 27.0*   * 399     Recent Labs     04/13/19  1313 04/14/19  0522    137   K 3.8 3.9    109   CO2 21 16*   BUN 31* 30*   CREATININE 4.2* 3.7*   CALCIUM 8.7 8.2*     No results for input(s): AST, ALT, BILIDIR, BILITOT, ALKPHOS in the last 72 hours. Recent Labs     04/13/19  1313   INR 1.26*     Recent Labs     04/13/19  1313   TROPONINI <0.01       Urinalysis:      Lab Results   Component Value Date    NITRU Negative 04/13/2019    WBCUA >100 04/13/2019    BACTERIA 3+ 04/13/2019    RBCUA 5-10 04/13/2019    BLOODU SMALL 04/13/2019    SPECGRAV 1.015 04/13/2019    GLUCOSEU Negative 04/13/2019       Radiology:  XR CHEST 1 VW   Final Result      No evidence for acute cardiopulmonary disease. XR HIP LEFT (2-3 VIEWS)   Final Result      1.  Acute left femoral neck fracture      XR PELVIS (1-2 VIEWS)    (Results Pending)   XR HIP LEFT (1 VIEW)    (Results Pending)   FLUORO FOR SURGICAL PROCEDURES    (Results Pending)           Assessment/Plan:    Active Hospital Problems    Diagnosis    Hip fracture requiring operative repair, left, closed, initial encounter ([facility] [address] [S72.002A]    UTI (urinary tract infection) [N39.0]    [facility] [N17.9]    Personal history of schizophrenia [Z86.59]    Pure hypercholesterolemia [E78.00]     Place Lee. If unable to, may need urology consultation  Continue antibiotics for UTI. Sepsis ruled out with normal blood pressure and lactate. Acute kidney injury, possibly component of obstructive nephropathy as well as post void residual was greater than 700 mL  Continue home medications for schizophrenia  Low-risk for surgery, okay to proceed with usual precautions    DVT Prophylaxis: Lovenox  Diet: Diet NPO, After Midnight  Code Status: Full Code    PT/OT Eval Status:  Will order after surgery    Dispo - inpatient    Abimbola Putnam MD  3:44 PM 4/14/2019

## 2019-04-14 NOTE — BRIEF OP NOTE
Brief Postoperative Note  ______________________________________________________________    Patient: Gavin Garcia  YOB: 1955  MRN: [id number]  Date of Procedure: 4/14/2019    Pre-Op Diagnosis: LEFT FEMORAL NECK FRACTURE    Post-Op Diagnosis: Same       Procedure(s):  CYSTO, URETHRAL DILATION, KWAN PLACEMENT    Anesthesia: Spinal    Surgeon(s):  MD Logan Flower MD    Assistant: N/A    Estimated Blood Loss (mL): less than 50     Complications: None    Specimens:   * No specimens in log *    Implants:  * No implants in log *      Drains: * No LDAs found *    Findings: GROSSLY PURULENT URINE. MID PENILE URETHRAL STRICTURE    PLAN: STRICTURE HAS BEEN TREATED. RECOMMEND KWAN X 4-5 DAYS, THEN VOIDING TRIAL  URINE WAS GROSSLY INFECTED, BUT MAY JUST BE DEBRIS/CHRONIC RETENTION. URINE CX SENT FROM KWAN.    Bulmaro Isabel MD  Date: 4/14/2019  Time: 1:47 PM

## 2019-04-14 NOTE — PROGRESS NOTES
PACU Transfer Note    Vitals:    04/14/19 1630   BP: 114/71   Pulse: 98   Resp: 11   Temp: 97.5 °F (36.4 °C)   SpO2: 93%       In: 1090 [P.O.:240; I.V.:850]  Out: 800 [Urine:500]    Pain assessment:  present - adequately treated. Patient states he has pain but feels very comfortable at this time. Pain Level: 0    Report given to Receiving unit RN. Rl, RN on 5 tower receiving patient updated on patient status. Informed that patients ankle detector/alarm unit had to be cut off in the OR which has been taped to the inside of his chart. Patients telemetry box re-connected to patient upon his departure from PACU. No further changes.        4/14/2019 4:46 PM

## 2019-04-14 NOTE — PLAN OF CARE
Patient is a is a high fall risk and is up with assist x2 with the walker and gait belt after surgery. Patient alert and oriented x4, fall precautions in place, bed in lowest position and locked, bed alarm on for safety, call light, bedside table  and personal belongings with in reach. Will continue to monitor.

## 2019-04-14 NOTE — PLAN OF CARE
Problem: Falls - Risk of:  Goal: Will remain free from falls  Description  Will remain free from falls. Patient is a high fall risk. All fall precautions in place: bed alarm on, nonskid socks on pt, call light within reach, bed locked in lowest position. Will continue to monitor pt safety. Outcome: Ongoing     Problem: Risk for Impaired Skin Integrity  Goal: Tissue integrity - skin and mucous membranes  Description  Structural intactness and normal physiological function of skin and  mucous membranes. Pt skin remains intact. Pt is incontinent of urine and is being changed and repositioned q2 hours.  Will continue to monitor  Outcome: Ongoing

## 2019-04-15 LAB
ANION GAP SERPL CALCULATED.3IONS-SCNC: 13 MMOL/L (ref 3–16)
BUN BLDV-MCNC: 29 MG/DL (ref 7–20)
CALCIUM SERPL-MCNC: 8.2 MG/DL (ref 8.3–10.6)
CHLORIDE BLD-SCNC: 108 MMOL/L (ref 99–110)
CO2: 17 MMOL/L (ref 21–32)
CREAT SERPL-MCNC: 3.8 MG/DL (ref 0.8–1.3)
EKG ATRIAL RATE: 97 BPM
EKG DIAGNOSIS: NORMAL
EKG P AXIS: 79 DEGREES
EKG P-R INTERVAL: 178 MS
EKG Q-T INTERVAL: 360 MS
EKG QRS DURATION: 92 MS
EKG QTC CALCULATION (BAZETT): 457 MS
EKG R AXIS: 61 DEGREES
EKG T AXIS: 52 DEGREES
EKG VENTRICULAR RATE: 97 BPM
GFR AFRICAN AMERICAN: 19
GFR NON-AFRICAN AMERICAN: 16
GLUCOSE BLD-MCNC: 114 MG/DL (ref 70–99)
HCT VFR BLD CALC: 26 % (ref 40.5–52.5)
HEMOGLOBIN: 8.5 G/DL (ref 13.5–17.5)
POTASSIUM SERPL-SCNC: 4 MMOL/L (ref 3.5–5.1)
SODIUM BLD-SCNC: 138 MMOL/L (ref 136–145)

## 2019-04-15 PROCEDURE — 80048 BASIC METABOLIC PNL TOTAL CA: CPT

## 2019-04-15 PROCEDURE — 97530 THERAPEUTIC ACTIVITIES: CPT

## 2019-04-15 PROCEDURE — 97166 OT EVAL MOD COMPLEX 45 MIN: CPT

## 2019-04-15 PROCEDURE — 2580000003 HC RX 258: Performed by: ORTHOPAEDIC SURGERY

## 2019-04-15 PROCEDURE — 97161 PT EVAL LOW COMPLEX 20 MIN: CPT

## 2019-04-15 PROCEDURE — 93010 ELECTROCARDIOGRAM REPORT: CPT | Performed by: INTERNAL MEDICINE

## 2019-04-15 PROCEDURE — 6360000002 HC RX W HCPCS: Performed by: FAMILY MEDICINE

## 2019-04-15 PROCEDURE — 85014 HEMATOCRIT: CPT

## 2019-04-15 PROCEDURE — 2500000003 HC RX 250 WO HCPCS: Performed by: INTERNAL MEDICINE

## 2019-04-15 PROCEDURE — 2580000003 HC RX 258: Performed by: FAMILY MEDICINE

## 2019-04-15 PROCEDURE — 36415 COLL VENOUS BLD VENIPUNCTURE: CPT

## 2019-04-15 PROCEDURE — 93005 ELECTROCARDIOGRAM TRACING: CPT | Performed by: INTERNAL MEDICINE

## 2019-04-15 PROCEDURE — 6370000000 HC RX 637 (ALT 250 FOR IP): Performed by: FAMILY MEDICINE

## 2019-04-15 PROCEDURE — 6370000000 HC RX 637 (ALT 250 FOR IP): Performed by: ORTHOPAEDIC SURGERY

## 2019-04-15 PROCEDURE — 2580000003 HC RX 258: Performed by: INTERNAL MEDICINE

## 2019-04-15 PROCEDURE — 97116 GAIT TRAINING THERAPY: CPT

## 2019-04-15 PROCEDURE — 1200000000 HC SEMI PRIVATE

## 2019-04-15 PROCEDURE — 85018 HEMOGLOBIN: CPT

## 2019-04-15 RX ORDER — METOPROLOL TARTRATE 5 MG/5ML
5 INJECTION INTRAVENOUS ONCE
Status: COMPLETED | OUTPATIENT
Start: 2019-04-15 | End: 2019-04-15

## 2019-04-15 RX ADMIN — SENNOSIDES, DOCUSATE SODIUM 1 TABLET: 50; 8.6 TABLET, FILM COATED ORAL at 21:39

## 2019-04-15 RX ADMIN — ATORVASTATIN CALCIUM 10 MG: 10 TABLET, FILM COATED ORAL at 21:39

## 2019-04-15 RX ADMIN — ARIPIPRAZOLE 5 MG: 5 TABLET ORAL at 21:39

## 2019-04-15 RX ADMIN — Medication 10 ML: at 09:34

## 2019-04-15 RX ADMIN — HYDROCODONE BITARTRATE AND ACETAMINOPHEN 2 TABLET: 10; 325 TABLET ORAL at 21:43

## 2019-04-15 RX ADMIN — APIXABAN 2.5 MG: 2.5 TABLET, FILM COATED ORAL at 21:39

## 2019-04-15 RX ADMIN — DOCUSATE SODIUM 100 MG: 100 CAPSULE, LIQUID FILLED ORAL at 09:34

## 2019-04-15 RX ADMIN — SODIUM BICARBONATE: 84 INJECTION, SOLUTION INTRAVENOUS at 15:01

## 2019-04-15 RX ADMIN — Medication 5 MG: at 21:40

## 2019-04-15 RX ADMIN — HYDROCODONE BITARTRATE AND ACETAMINOPHEN 1 TABLET: 10; 325 TABLET ORAL at 14:31

## 2019-04-15 RX ADMIN — TAMSULOSIN HYDROCHLORIDE 0.8 MG: 0.4 CAPSULE ORAL at 21:40

## 2019-04-15 RX ADMIN — APIXABAN 2.5 MG: 2.5 TABLET, FILM COATED ORAL at 09:33

## 2019-04-15 RX ADMIN — ARIPIPRAZOLE 5 MG: 5 TABLET ORAL at 09:33

## 2019-04-15 RX ADMIN — THERA TABS 1 TABLET: TAB at 09:34

## 2019-04-15 RX ADMIN — DOCUSATE SODIUM 100 MG: 100 CAPSULE, LIQUID FILLED ORAL at 21:39

## 2019-04-15 RX ADMIN — CEFEPIME HYDROCHLORIDE 1 G: 1 INJECTION, POWDER, FOR SOLUTION INTRAMUSCULAR; INTRAVENOUS at 21:40

## 2019-04-15 RX ADMIN — SENNOSIDES, DOCUSATE SODIUM 1 TABLET: 50; 8.6 TABLET, FILM COATED ORAL at 09:33

## 2019-04-15 RX ADMIN — METOPROLOL TARTRATE 5 MG: 5 INJECTION, SOLUTION INTRAVENOUS at 15:01

## 2019-04-15 RX ADMIN — Medication 325 MG: at 09:33

## 2019-04-15 ASSESSMENT — PAIN DESCRIPTION - PROGRESSION: CLINICAL_PROGRESSION: NOT CHANGED

## 2019-04-15 ASSESSMENT — PAIN DESCRIPTION - PAIN TYPE
TYPE: ACUTE PAIN
TYPE: ACUTE PAIN

## 2019-04-15 ASSESSMENT — PAIN DESCRIPTION - ONSET: ONSET: ON-GOING

## 2019-04-15 ASSESSMENT — PAIN DESCRIPTION - DESCRIPTORS: DESCRIPTORS: ACHING

## 2019-04-15 ASSESSMENT — PAIN DESCRIPTION - LOCATION: LOCATION: HIP

## 2019-04-15 ASSESSMENT — PAIN SCALES - GENERAL
PAINLEVEL_OUTOF10: 7
PAINLEVEL_OUTOF10: 0
PAINLEVEL_OUTOF10: 4
PAINLEVEL_OUTOF10: 0

## 2019-04-15 ASSESSMENT — PAIN DESCRIPTION - ORIENTATION: ORIENTATION: LEFT

## 2019-04-15 ASSESSMENT — PAIN DESCRIPTION - FREQUENCY: FREQUENCY: CONTINUOUS

## 2019-04-15 NOTE — PROGRESS NOTES
Department of Orthopedic Surgery     Progress Note    Subjective:   Admit Day:4/13/2019    Patient is comfortable appearing. Denies chest pain, shortness of air or calf pain. Tolerating PO. Objective[de-identified]    height is 5' 10\" (1.778 m) and weight is 215 lb 3.2 oz (97.6 kg). His oral temperature is 98.3 °F (36.8 °C). His blood pressure is 117/67 and his pulse is 105. His respiration is 16 and oxygen saturation is 92%. General:  No acute distress. Some SOB when speaking. Operative Incision:  Dressing is clean, dry and intact. Operative Extremity:  - Motor function intact to Tibialis Anterior, Gastroc-Soleus Complex, Extensor Hallucis Longus, and Flexor Hallucis Longus.  -  Sensation intact to light touch in sural, saphenous, superficial peroneal, deep peroneal and tibial nerve distributions. -  Toes are wwp with a palpable dorsalis pedis pulse. Negative Edgard's Sign Bilaterally    Labs:  Lab Results   Component Value Date    WBC 13.5 04/14/2019    HGB 8.5 04/15/2019    HCT 26.0 04/15/2019     04/14/2019       Lab Results   Component Value Date    INR 1.26 04/13/2019       Lab Results   Component Value Date     04/14/2019    K 3.9 04/14/2019    CO2 16 04/14/2019    BUN 30 04/14/2019    CREATININE 3.7 04/14/2019    CALCIUM 8.2 04/14/2019       Assessment:   Principal Problem:    Hip fracture requiring operative repair, left, closed, initial encounter ([facility] [address]  Active Problems:    Pure hypercholesterolemia    Personal history of schizophrenia    UTI (urinary tract infection)    LIANA (acute kidney injury) ([facility] [address]  Resolved Problems:    * No resolved hospital problems. *      POD # 1 s/p Left Anterior MARIBEL. Patient lying comfortably in bed this AM. Prevena wound vac in place. No complaints of pain. Able to demonstrate strong dorsiflexion. Will continue to monitor mobility progress and medical stability. Doing well from ortho standpoint. Plan:   1. Weightbearing as tolerated   2.   DVT Prophylaxis:

## 2019-04-15 NOTE — PLAN OF CARE
Problem: Pain:  Goal: Pain level will decrease  Description. Pain is controlled with PRN [place]. Pt reports relief after pain medication is administered. VSS. Will continue to monitor. Pain level will decrease  4/14/2019 2359 by Perez Jimenez RN  Outcome: Ongoing    Problem: Falls - Risk of:  Goal: Will remain free from falls  Description Patient is a high fall risk. All fall precautions in place: bed alarm on, nonskid socks on pt, call light within reach, bed locked in lowest position. Will continue to monitor pt safety.    Will remain free from falls  4/14/2019 6019 by Perez Jimenez RN  Outcome: Ongoing

## 2019-04-15 NOTE — PROGRESS NOTES
Episode of increased sustained HR this afternoon- 120's 130s- back to bed from chair- prn pain med administered call to MD- EKG and stat metoprolol given . HR remains below 100 since. Cont to monitor.

## 2019-04-15 NOTE — PROGRESS NOTES
Occupational Therapy   Occupational Therapy Initial Assessment/Tx Note  Date: 4/15/2019   Patient Name: Guzman Escobedo  MRN: [id number]     : 1955    Date of Service: 4/15/2019  Assessment: Functional independence is decreased from baseline. Pt is typically independent with mobility and able to participate in all ADLs with min assist. Despite cognitive deficits, pt cooperative and able to actively participate in therapy. On eval, he requires assist for all mobility, tolerates short distance/transfers. ADL assessment limited by anticipate assist will be needed for toileting, dressing, bathing. Pt would benefit from continued inpt OT/PT at d/c to regain independence. Discharge Recommendations: Guzman Escobedo scored a 17/24 on the AM-PAC ADL Inpatient form. Current research shows that an AM-PAC score of 17 or less is typically not associated with a discharge to the patient's home setting. Based on the patients AM-PAC score and their current ADL deficits, it is recommended that the patient have 3-5 sessions per week of Occupational Therapy at d/c to increase the patients independence. OT Equipment Recommendations  Equipment Needed: No    Assessment   Performance deficits / Impairments: Decreased functional mobility ; Decreased ADL status; Decreased endurance;Decreased balance  Assessment: Functional independence is decreased from baseline. Pt is typically independent with mobility and able to participate in all ADLs with min assist. Despite cognitive deficits, pt cooperative and able to actively participate in therapy. On eval, he requires assist for all mobility, tolerates short distance/transfers. ADL assessment limited by anticipate assist will be needed for toileting, dressing, bathing. Pt would benefit from continued inpt OT/PT at d/c to regain independence.    Treatment Diagnosis: Decreased activity tolerance, impaired ADLs and mobility  Decision Making: Medium Complexity  Patient Education: Role of OT, d/c planning, activity promotion, hip precautions, transfers - verb partial understanding  REQUIRES OT FOLLOW UP: Yes  Activity Tolerance  Activity Tolerance: Patient Tolerated treatment well  Activity Tolerance: FLORES with bed mobility and transfer on RA. spO2 94%. HR 110s-120s. Recovers with rest break. Safety Devices  Safety Devices in place: Yes  Type of devices: Call light within reach; Chair alarm in place;Nurse notified; Left in chair(needs in reach, assist level on board and RN aware - Ax1-2 with walker vs. Cindi Never and gait belt)          Treatment Diagnosis: Decreased activity tolerance, impaired ADLs and mobility      Restrictions  Position Activity Restriction  Other position/activity restrictions: full weight bearing as tolerated, anterior hip precautions    Subjective   General  Chart Reviewed: Yes  Additional Pertinent Hx: 75 y.o. M admitted 4/13 after fall resulting in L displaced femoral neck fx. s/p L MARIBEL and cysto for quigley placement 4/14. PMHx includes neurodermatitis, schizophrenia, foot fx surgery  Family / Caregiver Present: No  Referring Practitioner: Amaya Skinner  Diagnosis: hip fx  Subjective  Subjective: Pt in bed sleeping on entry. Stated he's trying to nap but is pleasant and cooperative therapy. Pain is \"fine\" when at rest, but reports during transfers \"it's so hard I almost can't do it. \"   Pain Assessment  Pain Assessment: 0-10(L hip pain with movement and weight bearing, little to no pain at rest)    Social/Functional History  Social/Functional History  Lives With: Alone  Type of Home: Facility([facility]  Home Layout: One level  Home Access: Level entry  Bathroom Shower/Tub: Walk-in shower  Bathroom Toilet: Handicap height  Bathroom Equipment: Grab bars in shower, Grab bars around toilet  Home Equipment: Rolling walker  ADL Assistance: Independent(per pt )  Ambulation Assistance: Independent(with rolling walker)  Transfer Assistance: [address]: watching TV  Additional Comments: Reports maybe one other fall, does not fall frequently. Pt wear an ankle bracelet due to wandering. Per OT note from 2017, pt up ad shea with walker, spvn for showers, and min assist for toileting for thorough pericare. Objective   Vision: Within Functional Limits(with glasses)  Hearing: Within functional limits    Orientation  Overall Orientation Status: Impaired  Orientation Level: Oriented to person;Disoriented to place; Disoriented to time;Oriented to situation     Balance  Sitting Balance: Stand by assistance(at EOB, leaning to R to offweight L hip)  Standing Balance: Minimal assistance(x1-2 static at walker)  Standing Balance  Time: 2-3 min  Sit to stand: 2 Person assistance(min assist x2)  Stand to sit: 2 Person assistance(min assist x2, mod cues)  Functional Mobility  Functional - Mobility Device: Rolling Walker  Assist Level: Minimal assistance(x2)  Functional Mobility Comments: stand step transfer bed to chair with mod cues, increased time and effort  Toilet Transfers  Toilet - Technique: Stand step  Equipment Used: Standard bedside commode(simulated)  Toilet Transfer: 2 Person assistance(min assist x2)  ADL  Feeding: Independent; Beverage management  Grooming: Modified independent ;Setup  UE Dressing: Minimal assistance  LE Dressing: Maximum assistance(simulated for pants; dependent for socks)  Toileting: Unable to assess(comment)(catheter in place; anticipate at least mod assist via toilet)        Bed mobility  Supine to Sit: 2 Person assistance(mod assist x2 (HOB raised, rail used, mod cues))  Scooting: Moderate assistance  Transfers  Sit to stand: 2 Person assistance(min assist x2)  Stand to sit: 2 Person assistance(min assist x2, mod cues)     Cognition  Overall Cognitive Status: Exceptions  Cognition Comment: alert, oriented to self, hospital, having a fall PTA. Answers simple questions with increased time. Follows simple commands with repetition.  Impaired short and long term memory. Plan  If pt discharges prior to next tx, this note will serve as d/c summary. Continue per POC if pt does not d/c.     Plan  Times per week: 7x  Times per day: Daily                 AM-PAC Score       AM-PAC Inpatient Daily Activity Raw Score: 17  AM-PAC Inpatient ADL T-Scale Score : 37.26  ADL Inpatient CMS 0-100% Score: 50.11  ADL Inpatient CMS G-Code Modifier : CK    Goals  Short term goals  Time Frame for Short term goals: by D/C  Short term goal 1: Transfer to/from commode/chair with CGA - Not met  Short term goal 2:  Increase standing tolerance to 5 min with CGA for self care - Not met  Patient Goals   Patient goals : to get back to walking       Therapy Time   Individual Concurrent Group Co-treatment   Time In 1012         Time Out 1042         Minutes 30          Timed Code Tx Min: 15  Total Tx Time: 30       Kasey Zurita, OT

## 2019-04-15 NOTE — PROGRESS NOTES
Pt is alert and oriented to self and situation . VSS. Pt deneis any pain at this time. Has decreased sensation to LLE d/t block. Purple dressing and  wound vac remain in place. Lee remain in place and draining pink tinged cloudy urine. All fall precaution in place. Will continue to monitor.

## 2019-04-15 NOTE — CONSULTS
[facility]    [facility]              [phone]                     Plan :     Cr coming down  Agree with bicarb drip  BP stable but on low side     Assessment :     Acute Kidney Injury  Cr peaked to 4.2 - now 3.7  Likely ATN from sepsis    UA- large blood, LE large, high wbc has quigley( getting abx)    Acidosis  Agree with bicarb drip     CKD Stage III  Followed by Dr Luis Dahl in office  H/o Bilateral hydronephrosis  Has indwelling Quigley catheter  Cr 2 in 1/2015    Hypertension  BP: (117)/(67)  Pulse:  [105]   BP is stable    Anemia of CKD  Hgb- 8.5  Likely due to fracture, blood loss    Renal Osteodystrophy  PO4- 4  PTH not reliable in presence of LIANA, since LIANA increases PTH, and     Will come down with improvement    [facility] would like to thank Mike Cain MD   for opportunity to serve this patient      Please call with questions at-   24 Hrs Answering service [phone] or  7 am- 5 pm via Perfect serve or cell phone  Dr.Sudhir Ramiro Farris          CC/reason for consult :     LIANA     HPI :     Royer George is a 75 y.o. male presented to   the hospital on 4/13/2019 with left hip pain. He is a NH resident, was walking around  When he nearly passed out, and hit hip. Pain constant, getting worse for few days,  Associated with difficulty walking. He normally  Uses walker to mobilize. He is found to have hip fracture and will  Need  Operative repair. In the meantime, he is noted to have LIANA on   CKD for which we are consultec.      Interval History:     Made 2.9 L urine yesterday  Cr coming down    ROS:     Seen with- no family    positives in bold   Constitutional:  fever, chills, weakness, weight change, fatigue  Skin:  rash, pruritus, hair loss, bruising, dry skin, petechiae  Head, Face, Neck   headaches, swelling,  cervical adenopathy  Respiratory: shortness of breath, cough, or wheezing  Cardiovascular: chest pain, palpitations, dizzy, edema  Gastrointestinal: nausea, vomiting, diarrhea, constipation,belly pain    Yellow skin, blood in stool  Musculoskeletal:  back pain, muscle weakness, gait problems,       joint pain or swelling. Genitourinary:  dysuria, poor urine flow, flank pain, blood in urine  Neurologic:  vertigo, TIA'S, syncope, seizures, focal weakness  Psychosocial:  insomnia, anxiety, or depression. Additional positive findings:                          All other remaining systems are negative.         PMH/PSH/SH/Family History:     Past Medical History:   Diagnosis Date    Encounter for long-term (current) use of other medications     History of colonoscopy with polypectomy 6/06    Hypercholesterolemia     Neurodermatitis     Personal history of mental disorder     Schizophrenia    Personal history of schizophrenia 7/26/2010    Pure hypercholesterolemia 7/26/2010    Rosacea     acne    Smoker     Special screening for malignant neoplasm of prostate     Tobacco abuse     Tobacco use disorder 7/26/2010       Past Surgical History:   Procedure Laterality Date    FOOT FRACTURE SURGERY      repair       Social History     Socioeconomic History    Marital status: Unknown     Spouse name: Not on file    Number of children: Not on file    Years of education: Not on file    Highest education level: Not on file   Occupational History    Not on file   Social Needs    Financial resource strain: Not on file    Food insecurity:     Worry: Not on file     Inability: Not on file    Transportation needs:     Medical: Not on file     Non-medical: Not on file   Tobacco Use    Smoking status: Former Smoker   Substance and Sexual Activity    Alcohol use: No    Drug use: No    Sexual activity: Not on file   Lifestyle    Physical activity:     Days per week: Not on file     Minutes per session: Not on file    Stress: Not on file   Relationships    Social connections:     Talks on phone: Not on file     Gets together: Not on file     Attends [employer] service: Not on file     Active member of club or organization: Not on file     Attends meetings of clubs or organizations: Not on file     Relationship status: Not on file    Intimate partner violence:     Fear of current or ex partner: Not on file     Emotionally abused: Not on file     Physically abused: Not on file     Forced sexual activity: Not on file   Other Topics Concern    Not on file   Social History Narrative    Not on file       No family history on file. Medication:     Scheduled Meds:   sodium chloride flush  10 mL Intravenous 2 times per day    docusate sodium  100 mg Oral BID    apixaban  2.5 mg Oral BID    atorvastatin  10 mg Oral Nightly    ARIPiprazole  5 mg Oral BID    ferrous sulfate  325 mg Oral Daily with breakfast    melatonin  5 mg Oral Nightly    multivitamin  1 tablet Oral Daily    sennosides-docusate sodium  1 tablet Oral BID    tamsulosin  0.8 mg Oral Nightly    cefepime  1 g Intravenous Q24H     Continuous Infusions:   sodium bicarbonate infusion 100 mL/hr at 04/14/19 2235     PRN Meds:.sodium chloride flush, morphine **OR** morphine, HYDROcodone-acetaminophen **OR** HYDROcodone-acetaminophen, acetaminophen, ondansetron, polyethylene glycol, acetaminophen       Vitals :     Vitals:    04/15/19 0330   BP: 117/67   Pulse: 105   Resp: 16   Temp: 98.3 °F (36.8 °C)   SpO2: 92%       I & O :       Intake/Output Summary (Last 24 hours) at 4/15/2019 0816  Last data filed at 4/15/2019 0334  Gross per 24 hour   Intake 1090 ml   Output 3250 ml   Net -2160 ml        Physical Examination :     General appearance: Anxious- yes, distressed- no, in good spirits- no  verbal  HEENT: Lips- normal, teeth- ok , oral mucosa- moist  Neck : Mass- no, appears symmetrical, JVD- not visible  Respiratory: Respiratory effort-  Normal on oxygen , wheeze- no, crackles - no  Cardiovascular:  Ausculation- No M/R/G, Edema yes trace  Abdomen: visible mass- no, distention- no, scar- no, tenderness- no hepatosplenomegaly-  no  Musculoskeletal:  clubbing no,cyanosis- no , digital ischemia- no                           muscle strength- grossly normal , tone - grossly normal  Skin: rashes- no , ulcers- no, induration- no, tightening - no  Psychiatric:  Judgement and insight- normal           AAO X 3     LABS:     Recent Labs     04/13/19  1313 04/14/19  0522 04/15/19  0528   WBC 13.0* 13.5*  --    HGB 9.9* 8.9* 8.5*   HCT 30.8* 27.0* 26.0*   * 399  --      Recent Labs     04/13/19  1313 04/14/19  0522    137   K 3.8 3.9    109   CO2 21 16*   BUN 31* 30*   CREATININE 4.2* 3.7*   GLUCOSE 105* 121*   MG  --  1.80

## 2019-04-15 NOTE — CARE COORDINATION
Pt is from [facility] and is a long term care patient. Pt can return to the facility once medically stable.      Electronically signed by Giovanny Chowdary RN on 4/15/2019 at 11:05 AM  [phone]

## 2019-04-15 NOTE — OP NOTE
the _____ 20-[place] [employer]-tip catheter is now placed over the  wire and into the bladder. 10 mL is placed in the balloon and the  bladder is now drained. There is about a liter of fluid that comes out  that looks grossly purulent and we will let Orthopedics know whether or  not they want to proceed with the case.     PLAN:  My plan from a  perspective will be to leave the catheter for  at least four days and do a voiding trial.        Greer Matias MD    D: 04/14/2019 14:01:55       T: 04/14/2019 18:46:14     [email or web address]  Job#: [id number]     Doc#: [id number]    CC:

## 2019-04-15 NOTE — PROGRESS NOTES
Physical Therapy    Facility/Department: [facility] [address]  Initial Assessment and Treatment    NAME: Nilda Jose  : 1955  MRN: [id number]    Date of Service: 4/15/2019    Discharge Recommendations:  Nilda Jose scored a  on the AM-PAC short mobility form. Current research shows that an AM-PAC score of 17 or less is typically not associated with a discharge to the patient's home setting. Based on the patients AM-PAC score and their current functional mobility deficits, it is recommended that the patient have 3-5 sessions per week of Physical Therapy at d/c to increase the patients independence. Patient would benefit from continued therapy after discharge   PT Equipment Recommendations  Equipment Needed: No    Assessment   Body structures, Functions, Activity limitations: Decreased functional mobility   Assessment: Pt is 76 yo M with decreased overall mobility and cognition, which is part of his baseline. Pt will benefit from continued inpt PT at d/c. Pt will need reminders of anterior approach precautions. Will follow. Treatment Diagnosis: Decreased functional mobility related to L hip fracture  Prognosis: Good  Decision Making: Low Complexity  Patient Education: Role of PT, ant hip precautions, WB status- pt verbalized understanding  REQUIRES PT FOLLOW UP: Yes  Activity Tolerance  Activity Tolerance: Patient Tolerated treatment well;Patient limited by fatigue       Patient Diagnosis(es): The primary encounter diagnosis was Closed fracture of left hip, initial encounter ([facility] [address] A diagnosis of LIANA (acute kidney injury) ([facility] was also pertinent to this visit.      has a past medical history of Encounter for long-term (current) use of other medications, History of colonoscopy with polypectomy, Hypercholesterolemia, Neurodermatitis, Personal history of mental disorder, Personal history of schizophrenia, Pure hypercholesterolemia, Rosacea, Smoker, Special screening for malignant neoplasm of prostate, Tobacco abuse, and Tobacco use disorder. has a past surgical history that includes Foot fracture surgery; HEMIARTHROPLASTY HIP (Left, 4/14/2019); and Cystoscopy (4/14/2019). Restrictions  Position Activity Restriction  Other position/activity restrictions: full weight bearing as tolerated, anterior hip precautions     Vision/Hearing  Vision: Within Functional Limits(with glassses)  Hearing: Within functional limits       Subjective  General  Chart Reviewed: Yes  Additional Pertinent Hx: Pt admitted on 4/13/19 with hip fracture. Pt underwent L hemiarthroplasty on 4/12/19. H/o colonoscopy, neurodermatitis, schizophrenia, rosacea, L THR 4/14/2019. Family / Caregiver Present: No  Referring Practitioner: Dr. Kristel Jacques  Diagnosis: L hip fracture  Subjective  Subjective: Pt supine in bed and agreeable to PT. Revewed WB status and hip precautions. Pt with some confusion to conversation. Pain: Yes, not rated, nursing notified    Orientation  Orientation  Overall Orientation Status: Impaired  Orientation Level: Oriented to place; Disoriented to time;Oriented to person;Disoriented to situation     Social/Functional History  Social/Functional History  Lives With: Alone  Type of Home: Facility([facility]  Home Layout: One level  Home Access: Level entry  Bathroom Shower/Tub: Walk-in shower  Bathroom Toilet: Handicap height  Bathroom Equipment: Grab bars in shower, Grab bars around toilet  Home Equipment: Rolling walker  ADL Assistance: Independent(per pt )  Ambulation Assistance: Independent(with rolling walker)  Transfer Assistance: Independent  Leisure & Hobbies: watching TV  Additional Comments: Reports maybe one other fall, does not fall frequently. Pt wear an ankle bracelet due to wandering. Per OT note from 2017, pt up ad shea with walker, spvn for showers, and min assist for toileting for thorough pericare.      Objective  AROM RLE (degrees)  RLE AROM: WNL  AROM LLE (degrees)  LLE General AROM: Limited hip motion, knee and ankle [facility]     Strength RLE  Strength RLE: WNL  Strength LLE  Comment: Knee and ankle 4/5, hip NT due to surgery     Bed mobility  Supine to Sit: 2 Person assistance(mod assist of 2, HOB raised, rail used, mod cues)  Scooting: Moderate assistance     Transfers  Sit to Stand: 2 Person Assistance(Min assist of 2)  Stand to sit: 2 Person Assistance(Min assist of 2)     Ambulation  Ambulation?: Yes  Ambulation 1  Device: Rolling Walker  Assistance: 2 Person assistance(Min assist of 2)  Quality of Gait: Small B steps, relying on walker, cues for sequence  Distance: 3 feet    Treatment:  Functional mobility training and pt education    Plan   Plan  Times per week: 7  Times per day: Daily  Current Treatment Recommendations: Strengthening, Functional Mobility Training, Gait Training, Transfer Training, Home Exercise Program, Safety Education & Training  Safety Devices  Type of devices: Call light within reach, Chair alarm in place, Left in chair, Nurse notified    AM-PAC Score  AM-PAC Inpatient Mobility Raw Score : 11  AM-PAC Inpatient T-Scale Score : 33.86  Mobility Inpatient CMS 0-100% Score: 72.57  Mobility Inpatient CMS G-Code Modifier : CL    Goals  Short term goals  Time Frame for Short term goals: by discharge  Short term goal 1: Bed mobility with min assist  Short term goal 2: Sit to stand with min assist  Short term goal 3: Pt will ambulate 30 feet with wheeled walker and CGA WBAT  Short term goal 4: Pt will perform THR HEP x 10 with min assist  Patient Goals   Patient goals : None stated     Therapy Time   Individual Concurrent Group Co-treatment   Time In 1009         Time Out 1048         Minutes 39           Timed Code Treatment Minutes:   24    Total Treatment Minutes:  39     If pt d/c'd prior to next treatment, this note serves as a discharge note.   Luigi Zabala

## 2019-04-16 LAB
ANION GAP SERPL CALCULATED.3IONS-SCNC: 11 MMOL/L (ref 3–16)
BUN BLDV-MCNC: 27 MG/DL (ref 7–20)
CALCIUM SERPL-MCNC: 8.1 MG/DL (ref 8.3–10.6)
CHLORIDE BLD-SCNC: 101 MMOL/L (ref 99–110)
CO2: 20 MMOL/L (ref 21–32)
CREAT SERPL-MCNC: 3.5 MG/DL (ref 0.8–1.3)
EKG ATRIAL RATE: 124 BPM
EKG DIAGNOSIS: NORMAL
EKG P AXIS: 75 DEGREES
EKG P-R INTERVAL: 168 MS
EKG Q-T INTERVAL: 300 MS
EKG QRS DURATION: 84 MS
EKG QTC CALCULATION (BAZETT): 431 MS
EKG R AXIS: 6 DEGREES
EKG T AXIS: 13 DEGREES
EKG VENTRICULAR RATE: 124 BPM
GFR AFRICAN AMERICAN: 21
GFR NON-AFRICAN AMERICAN: 18
GLUCOSE BLD-MCNC: 99 MG/DL (ref 70–99)
ORGANISM: ABNORMAL
POTASSIUM SERPL-SCNC: 3.2 MMOL/L (ref 3.5–5.1)
SODIUM BLD-SCNC: 132 MMOL/L (ref 136–145)
URINE CULTURE, ROUTINE: ABNORMAL
URINE CULTURE, ROUTINE: ABNORMAL

## 2019-04-16 PROCEDURE — 93010 ELECTROCARDIOGRAM REPORT: CPT | Performed by: INTERNAL MEDICINE

## 2019-04-16 PROCEDURE — 1200000000 HC SEMI PRIVATE

## 2019-04-16 PROCEDURE — 97535 SELF CARE MNGMENT TRAINING: CPT

## 2019-04-16 PROCEDURE — 2580000003 HC RX 258: Performed by: INTERNAL MEDICINE

## 2019-04-16 PROCEDURE — 6360000002 HC RX W HCPCS: Performed by: INTERNAL MEDICINE

## 2019-04-16 PROCEDURE — 97116 GAIT TRAINING THERAPY: CPT

## 2019-04-16 PROCEDURE — 6370000000 HC RX 637 (ALT 250 FOR IP): Performed by: ORTHOPAEDIC SURGERY

## 2019-04-16 PROCEDURE — 2580000003 HC RX 258: Performed by: ORTHOPAEDIC SURGERY

## 2019-04-16 PROCEDURE — 93005 ELECTROCARDIOGRAM TRACING: CPT | Performed by: INTERNAL MEDICINE

## 2019-04-16 PROCEDURE — 6370000000 HC RX 637 (ALT 250 FOR IP): Performed by: INTERNAL MEDICINE

## 2019-04-16 PROCEDURE — 97110 THERAPEUTIC EXERCISES: CPT

## 2019-04-16 PROCEDURE — 6370000000 HC RX 637 (ALT 250 FOR IP): Performed by: FAMILY MEDICINE

## 2019-04-16 PROCEDURE — 80048 BASIC METABOLIC PNL TOTAL CA: CPT

## 2019-04-16 PROCEDURE — 36415 COLL VENOUS BLD VENIPUNCTURE: CPT

## 2019-04-16 PROCEDURE — 97530 THERAPEUTIC ACTIVITIES: CPT

## 2019-04-16 RX ORDER — SODIUM CHLORIDE, SODIUM GLUCONATE, SODIUM ACETATE, POTASSIUM CHLORIDE AND MAGNESIUM CHLORIDE 526; 502; 368; 37; 30 MG/100ML; MG/100ML; MG/100ML; MG/100ML; MG/100ML
1000 INJECTION, SOLUTION INTRAVENOUS CONTINUOUS
Status: DISCONTINUED | OUTPATIENT
Start: 2019-04-16 | End: 2019-04-16

## 2019-04-16 RX ORDER — SODIUM CHLORIDE, SODIUM GLUCONATE, SODIUM ACETATE, POTASSIUM CHLORIDE AND MAGNESIUM CHLORIDE 526; 502; 368; 37; 30 MG/100ML; MG/100ML; MG/100ML; MG/100ML; MG/100ML
1000 INJECTION, SOLUTION INTRAVENOUS CONTINUOUS
Status: DISCONTINUED | OUTPATIENT
Start: 2019-04-16 | End: 2019-04-18

## 2019-04-16 RX ORDER — POTASSIUM CHLORIDE 20 MEQ/1
40 TABLET, EXTENDED RELEASE ORAL ONCE
Status: COMPLETED | OUTPATIENT
Start: 2019-04-16 | End: 2019-04-16

## 2019-04-16 RX ADMIN — ATORVASTATIN CALCIUM 10 MG: 10 TABLET, FILM COATED ORAL at 20:53

## 2019-04-16 RX ADMIN — TAMSULOSIN HYDROCHLORIDE 0.8 MG: 0.4 CAPSULE ORAL at 20:53

## 2019-04-16 RX ADMIN — APIXABAN 2.5 MG: 2.5 TABLET, FILM COATED ORAL at 09:40

## 2019-04-16 RX ADMIN — SENNOSIDES, DOCUSATE SODIUM 1 TABLET: 50; 8.6 TABLET, FILM COATED ORAL at 09:40

## 2019-04-16 RX ADMIN — SENNOSIDES, DOCUSATE SODIUM 1 TABLET: 50; 8.6 TABLET, FILM COATED ORAL at 20:53

## 2019-04-16 RX ADMIN — HYDROCODONE BITARTRATE AND ACETAMINOPHEN 1 TABLET: 10; 325 TABLET ORAL at 12:33

## 2019-04-16 RX ADMIN — APIXABAN 2.5 MG: 2.5 TABLET, FILM COATED ORAL at 20:53

## 2019-04-16 RX ADMIN — Medication 325 MG: at 09:41

## 2019-04-16 RX ADMIN — POTASSIUM CHLORIDE 40 MEQ: 20 TABLET, EXTENDED RELEASE ORAL at 11:56

## 2019-04-16 RX ADMIN — ARIPIPRAZOLE 5 MG: 5 TABLET ORAL at 20:53

## 2019-04-16 RX ADMIN — Medication 10 ML: at 20:53

## 2019-04-16 RX ADMIN — SODIUM CHLORIDE, SODIUM GLUCONATE, SODIUM ACETATE, POTASSIUM CHLORIDE AND MAGNESIUM CHLORIDE 1000 ML: 526; 502; 368; 37; 30 INJECTION, SOLUTION INTRAVENOUS at 20:58

## 2019-04-16 RX ADMIN — DOCUSATE SODIUM 100 MG: 100 CAPSULE, LIQUID FILLED ORAL at 20:53

## 2019-04-16 RX ADMIN — DOCUSATE SODIUM 100 MG: 100 CAPSULE, LIQUID FILLED ORAL at 09:40

## 2019-04-16 RX ADMIN — Medication 5 MG: at 20:53

## 2019-04-16 RX ADMIN — SODIUM CHLORIDE, SODIUM GLUCONATE, SODIUM ACETATE, POTASSIUM CHLORIDE AND MAGNESIUM CHLORIDE 1000 ML: 526; 502; 368; 37; 30 INJECTION, SOLUTION INTRAVENOUS at 12:23

## 2019-04-16 RX ADMIN — MEROPENEM 1 G: 1 INJECTION, POWDER, FOR SOLUTION INTRAVENOUS at 18:12

## 2019-04-16 RX ADMIN — Medication 10 ML: at 09:41

## 2019-04-16 RX ADMIN — MEROPENEM 1 G: 1 INJECTION, POWDER, FOR SOLUTION INTRAVENOUS at 11:45

## 2019-04-16 RX ADMIN — ARIPIPRAZOLE 5 MG: 5 TABLET ORAL at 09:41

## 2019-04-16 RX ADMIN — THERA TABS 1 TABLET: TAB at 09:40

## 2019-04-16 ASSESSMENT — PAIN SCALES - GENERAL
PAINLEVEL_OUTOF10: 0
PAINLEVEL_OUTOF10: 0
PAINLEVEL_OUTOF10: 3

## 2019-04-16 ASSESSMENT — PAIN DESCRIPTION - PROGRESSION: CLINICAL_PROGRESSION: NOT CHANGED

## 2019-04-16 NOTE — PROGRESS NOTES
Urology Attending Progress Note      Subjective: no  complaints     Vitals:  /72   Pulse 92   Temp 98.5 °F (36.9 °C) (Oral)   Resp 18   Ht 5' 10\" (1.778 m)   Wt 215 lb 3.2 oz (97.6 kg)   SpO2 95%   BMI 30.88 kg/m²   Temp  Av.5 °F (36.9 °C)  Min: 97.8 °F (36.6 °C)  Max: 98.8 °F (37.1 °C)    Intake/Output Summary (Last 24 hours) at 2019 1117  Last data filed at 2019 0947  Gross per 24 hour   Intake 720 ml   Output 2895 ml   Net -2175 ml       Exam: abd soft and non-tender.  Quigley draining clear urine     Labs:  WBC:    Lab Results   Component Value Date    WBC 13.5 2019     Hemoglobin/Hematocrit:    Lab Results   Component Value Date    HGB 8.5 04/15/2019    HCT 26.0 04/15/2019     BMP:    Lab Results   Component Value Date     2019    K 3.2 2019    K 3.9 2019     2019    CO2 20 2019    BUN 27 2019    LABALBU 3.0 2017    CREATININE 3.5 2019    CALCIUM 8.1 2019    GFRAA 21 2019    LABGLOM 18 2019     PT/INR:    Lab Results   Component Value Date    PROTIME 14.4 2019    INR 1.26 2019     PTT:    Lab Results   Component Value Date    APTT 28.2 2019   [APTT      Urine Culture:  E.coli ESBL 50 K    Blood Culture:  NGTD    Antibiotic Therapy:  Merrem     Imaging: no new       Impression/Plan:  76 yo M POD#1 s/p Lt hip replacement and POD#2 s/p cysto, U-dil, and quigley placement   -Cr @ 3.5 from 3.8   -continue abx  -continue flomax  -voiding trial in 2-3 days         Trevor Oliva, APRN - CNP

## 2019-04-16 NOTE — PROGRESS NOTES
Physical Therapy  Facility/Department: [facility] 5T ORTHO/NEURO  Daily Treatment Note  NAME: Charlene Gee  : 1955  MRN: [id number]    Date of Service: 2019    Discharge Recommendations:  Charlene Gee scored a  on the AM-PAC short mobility form. Current research shows that an AM-PAC score of 17 or less is typically not associated with a discharge to the patient's home setting. Based on the patients AM-PAC score and their current functional mobility deficits, it is recommended that the patient have 3-5 sessions per week of Physical Therapy at d/c to increase the patients independence. Patient would benefit from continued therapy after discharge   PT Equipment Recommendations  Equipment Needed: No    Patient Diagnosis(es): The primary encounter diagnosis was Closed fracture of left hip, initial encounter ([facility] [address] A diagnosis of LIANA (acute kidney injury) ([facility] [address] was also pertinent to this visit. has a past medical history of Encounter for long-term (current) use of other medications, ESBL (extended spectrum beta-lactamase) producing bacteria infection, History of colonoscopy with polypectomy, Hypercholesterolemia, Neurodermatitis, Personal history of mental disorder, Personal history of schizophrenia, Pure hypercholesterolemia, Rosacea, Smoker, Special screening for malignant neoplasm of prostate, Tobacco abuse, and Tobacco use disorder. has a past surgical history that includes Foot fracture surgery; HEMIARTHROPLASTY HIP (Left, 2019); and Cystoscopy (2019). Restrictions  Position Activity Restriction  Other position/activity restrictions: full weight bearing as tolerated, anterior hip precautions  Subjective   General  Chart Reviewed: Yes  Additional Pertinent Hx: Pt admitted on 19 with hip fracture. Pt underwent L hemiarthroplasty on 19. H/o colonoscopy, neurodermatitis, schizophrenia, rosacea, L THR 2019.   Family / Caregiver Present: No  Referring Practitioner:  Leonel  Subjective  Subjective: Pt seated in chair & agreeable to PT. Pain Screening  Patient Currently in Pain: (reports left hip \"hurts a little\", unable to rate.)  Vital Signs  Patient Currently in Pain: (reports left hip \"hurts a little\", unable to rate.)             Objective      Transfers  Sit to Stand: Moderate Assistance;Maximum Assistance(from chair to RW. verbal cues required.)  Stand to sit: Moderate Assistance;Minimal Assistance(verbal cues required)  Ambulation  Ambulation?: Yes  Ambulation 1  Surface: level tile  Device: Rolling Walker  Assistance: Minimal assistance  Quality of Gait: very slow blair, step-to pattern, decreased stance time & WB left LE. Distance: 30 ft  Comments: cues for sequence & safety with RW        Exercises  Quad Sets: x 10 bilat  Heelslides: x 10 left LE (mod A)  Knee Long Arc Quad: x 10 bilat  Ankle Pumps: x 20 bilat                        Assessment   Body structures, Functions, Activity limitations: Decreased functional mobility   Assessment: Pt progressing with functional mobility. Cont to recommend further inpt PT upon D/C. Will cont PT while here per plan of care.    Treatment Diagnosis: Decreased functional mobility related to L hip fracture  Prognosis: Good  Patient Education: transfers, gait, LE ther ex  REQUIRES PT FOLLOW UP: Yes  Activity Tolerance  Activity Tolerance: Patient Tolerated treatment well;Patient limited by fatigue                                                      AM-PAC Score  AM-PAC Inpatient Mobility Raw Score : 12  AM-PAC Inpatient T-Scale Score : 35.33  Mobility Inpatient CMS 0-100% Score: 68.66  Mobility Inpatient CMS G-Code Modifier : CL          Goals  Short term goals  Time Frame for Short term goals: by discharge  Short term goal 1: Bed mobility with min assist  Short term goal 2: Sit to stand with min assist  Short term goal 3: Pt will ambulate 30 feet with wheeled walker and CGA WBAT  Short term goal 4: Pt will perform THR HEP x 10 with min assist  Patient Goals   Patient goals : None stated    Plan    Plan  Times per week: 7  Times per day: Daily  Current Treatment Recommendations: Strengthening, Functional Mobility Training, Gait Training, Transfer Training, Home Exercise Program, Safety Education & Training  Safety Devices  Type of devices: Call light within reach, Chair alarm in place, Left in chair, Nurse notified     Therapy Time   Individual Concurrent Group Co-treatment   Time In [phone]         Time Out 5321         Minutes 32               This note will serve as a discharge summary if pt discharged prior to next treatment session.   Nidia Hamman Ruschulte, PT

## 2019-04-16 NOTE — CARE COORDINATION
2019  [facility])  Clinical Case Management Department    Consult received and spoke with patient who is from [facility] long term care and will return there upon discharge. Called to verify this when pt was admitted and no precert will be needed. Patient: Herman Rossi  MRN: [id number] / : 1955  ACCT: [de-identified]          Admission Documentation  Attending Provider: Marilyn Arenas MD  Admit date/time: 2019 12:39 PM  Status: Inpatient [101]  Diagnosis: Hip fracture requiring operative repair, left, closed, initial encounter [facility])     Readmission within last 30 days:  no     Living Situation  Discharge Planning  Living Arrangements: Other (Comment)  Support Systems: ECF/Assisted Living  Potential Assistance Needed: [facility]  Type of Home Care Services: Nursing Services  [facility]  Expected Discharge Date: 19    Service Assessment       Values / Beliefs  Do you have any ethnic, cultural, sacramental, or spiritual Protestant needs you would like us to be aware of while you are in the hospital?: No    Advance Directives (For Healthcare)  Pre-existing DNR Comfort Care/DNR Arrest/DNI Order: No  Healthcare Directive: Other (Comment)(Pt does not know)                        Destination  skilled nursing facility    Durable Medical Equipment   deferred    Home Health/Skilled Nursing  Services at Discharge: SNF Physical Therapy, Occupational Therapy and Nursing daily  Home care at home?  No Provider: JARED Provider Phone: [address]  NA   Pharmacy: none noted  Potential Assistance Purchasing Medications:  No  Does patient want to participate in local refill/meds to beds program?: No    Goals of Care  Patient expects to be discharged to[de-identified] [facility]  Patient plans for SNF: [facility] [phone]         Mode of transport from hospital: will need medical transport    Factors facilitating achievement of predicted outcomes: Cooperative and Pleasant    Barriers to discharge: none noted-no precert needed to return     Shira Irwin RN  [facility]  Case Management Department  Ph: [phone] Fax: [phone]

## 2019-04-16 NOTE — PROGRESS NOTES
Occupational Therapy  Facility/Department: [facility] 5T ORTHO/NEURO  Daily Treatment Note  NAME: Stephanie Mtz  : 1955  MRN: [id number]    Date of Service: 2019    Discharge Recommendations: Stephanie Mtz scored a 17/24 on the AM-PAC ADL Inpatient form. Current research shows that an AM-PAC score of 17 or less is typically not associated with a discharge to the patient's home setting. Based on the patients AM-PAC score and their current ADL deficits, it is recommended that the patient have 3-5 sessions per week of Occupational Therapy at d/c to increase the patients independence. Assessment: Pt demonstrated improved activity tolerance from previous session- tolerated further mobility/ADLs with less assist. Pt needs frequent reminders for re-orientation to situation, but remains pleasant and cooperative throughout. Feel pt would benefit from continued inpt therapy at d/c.     OT Equipment Recommendations  Equipment Needed: No    Assessment   Performance deficits / Impairments: Decreased functional mobility ; Decreased ADL status; Decreased endurance;Decreased balance    Treatment Diagnosis: Decreased activity tolerance, impaired ADLs and mobility    Patient Education: Role of OT, d/c planning, activity promotion, hip precautions, transfers - verb partial understanding    REQUIRES OT FOLLOW UP: Yes    Activity Tolerance: Patient Tolerated treatment well    Safety Devices in place: Yes  Type of devices: Left in chair;Nurse notified; Chair alarm in place;Call light within reach         Restrictions  Position Activity Restriction  Other position/activity restrictions: full weight bearing as tolerated, anterior hip precautions     Subjective   General  Chart Reviewed: Yes  Additional Pertinent Hx: 75 y.o. M admitted  after fall resulting in L displaced femoral neck fx. s/p L MARIBEL and cysto for quigley placement .  PMHx includes neurodermatitis, schizophrenia, foot fx surgery    Family / Caregiver Present: No    Referring Practitioner: Byron Dukes    Diagnosis: hip fx    Subjective  Subjective: Pt found supine in bed upon entry; agreeable to OT. Pleasant and cooperative, but needs frequent reminders for situation. Vital Signs  Patient Currently in Pain: Denies     Orientation  Orientation  Overall Orientation Status: Impaired  Orientation Level: Oriented to person;Disoriented to place; Disoriented to time;Disoriented to situation(able to recall place when later questioned)     Objective       Treatment included functional transfer training, ADLs, and patient education. ADL  Grooming: Setup(seated in chair to brush teeth)  Toileting: Moderate assistance(not wearing brief, quigley, assist needed for rear daniela care after bowel movement)    Balance  Sitting Balance: Supervision(unsupported at eob and commode)    Standing Balance  Activity: functional mobility to/from restroom, standing for toileting, toilet and sit-stand transfers   Sit to stand: Minimal assistance(from eob, cues for hand placement)  Stand to sit: Minimal assistance(sits slowly, cues for hand placement and controlled descent)    Functional Mobility  Functional - Mobility Device: Rolling Walker  Activity: To/from bathroom  Assist Level: Minimal assistance  Functional Mobility Comments: Increased time/effort to complete, cues for hand placement, to lead with LLE     Toilet Transfers  Toilet - Technique: Ambulating(with RW)  Equipment Used: Standard toilet(BUE support of grab bars on L side)  Toilet Transfer: Minimal assistance    Bed mobility  Supine to Sit: Moderate assistance(HOB raised, use of rail, cues for sequencing)    Transfers  Sit to stand: Minimal assistance(from eob, cues for hand placement)  Stand to sit: Minimal assistance(sits slowly, cues for hand placement and controlled descent)    Cognition  Overall Cognitive Status: Exceptions  Following Commands: Follows one step commands with increased time; Follows one step commands consistently  Memory: Decreased recall of recent events  Insights: Decreased awareness of deficits  Initiation: Requires cues for some  Sequencing: Requires cues for some       Plan   Plan  Times per week: 7x  Times per day: Daily  If patient discharges prior to next treatment, this note will serve as discharge summary. Will continue per POC if patient does not discharge. AM-PAC Score        AM-PAC Inpatient Daily Activity Raw Score: 17  AM-PAC Inpatient ADL T-Scale Score : 37.26  ADL Inpatient CMS 0-100% Score: 50.11  ADL Inpatient CMS G-Code Modifier : CK    Goals  Short term goals  Time Frame for Short term goals: by D/C  Short term goal 1: Transfer to/from commode/chair with CGA - Not met  Short term goal 2:  Increase standing tolerance to 5 min with CGA for self care - Not met    Patient Goals   Patient goals : to get back to walking       Therapy Time   Individual Concurrent Group Co-treatment   Time In 1100         Time Out 1146         Minutes 46           Timed Code Treatment Minutes:   46    Total Treatment Minutes:  [address]

## 2019-04-16 NOTE — PLAN OF CARE
Problem: Falls - Risk of:  Goal: Will remain free from falls  Description  Will remain free from falls  4/16/2019 1505 by Marita Jackson RN  Outcome: Met This Shift  4/16/2019 0441 by Lucille Angel RN  Outcome: Ongoing  Goal: Absence of physical injury  Description  Absence of physical injury  Outcome: Met This Shift  Free from fall. Alarms in place. monitoring  Problem: Risk for Impaired Skin Integrity  Goal: Tissue integrity - skin and mucous membranes  Description  Structural intactness and normal physiological function of skin and  mucous membranes. 4/16/2019 1505 by Marita Jackson RN  Outcome: Met This Shift  4/16/2019 0441 by Lucille Angel RN  Outcome: Ongoing   Repostioning- assesssment of skin - no breakdown noted  Problem: Pain - Acute:  Goal: Pain level will decrease  Description  Pain level will decrease  Outcome: Met This Shift     Problem: Pain:  Description  Pain management should include both nonpharmacologic and pharmacologic interventions.   Goal: Pain level will decrease  Description  Pain level will decrease  Outcome: Met This Shift  Goal: Control of acute pain  Description  Control of acute pain  4/16/2019 1505 by Marita Jackson RN  Outcome: Met This Shift  4/16/2019 0441 by Lucille Angel RN  Outcome: Ongoing  Goal: Control of chronic pain  Description  Control of chronic pain  Outcome: Met This Shift   Prn pain meds used as needed- effetive

## 2019-04-16 NOTE — PROGRESS NOTES
[facility]    [facility]              [phone]                     Plan :     Creatinine is coming down but very slowly- likely ATN  Replace potassium   Switch fluids to plasmalyte     Assessment :     Acute Kidney Injury  Cr peaked to 4.2 - now 3.5  Likely ATN from sepsis    UA- large blood, LE large, high wbc has quigley( getting abx)    Acidosis  Getting better  Was on bicarb drip  Switch to plasmalyte     CKD Stage III  Followed by Dr Christina Gee in office  H/o Bilateral hydronephrosis  Has indwelling Quigley catheter  Cr 2 in 1/2015    Echo 2017- EF of 18%, normal diastolic function  Mild TR with RVSP 31 mmHg    Hypertension  BP: ()/(59-72)  Pulse:  [92-94]   Still on low side    Anemia of CKD  Hgb- 8.5  Likely due to fracture, blood loss    Renal Osteodystrophy  PO4- 4  PTH not reliable in presence of LIANA, since LIANA increases PTH, and     Will come down with improvement    [facility] would like to thank Zonia Faustin MD   for opportunity to serve this patient      Please call with questions at-   24 Hrs Answering service [phone] or  7 am- 5 pm via Perfect serve or cell phone  Dr.Sudhir Alexander Cummings          CC/reason for consult :     LIANA     HPI :     Reggie Mendieta is a 75 y.o. male presented to   the hospital on 4/13/2019 with left hip pain. He is a [facility] resident, was walking around  When he nearly passed out, and hit hip. Pain constant, getting worse for few days,  Associated with difficulty walking. He normally  Uses walker to mobilize. He is found to have hip fracture and will  Need  Operative repair. In the meantime, he is noted to have LIANA on   CKD for which we are consultec.      Interval History:       Made 2.9 L urine yesterday    ROS:     Seen with- no family  SOB- none  Edema- none  Nausea/vomiting- none  Poor appetite-No  Confusion- no  Urinary complaints- no  Any other complaints- no  All other ROS are reviewed and are Negative    Vitals :     Vitals: 04/16/19 0745   BP: 115/72   Pulse: 92   Resp: 18   Temp: 98.5 °F (36.9 °C)   SpO2: 95%       I & O :       Intake/Output Summary (Last 24 hours) at 4/16/2019 1105  Last data filed at 4/16/2019 8403  Gross per 24 hour   Intake 720 ml   Output 2895 ml   Net -2175 ml        Physical Examination :     General appearance: Anxious- yes, distressed- no, in good spirits- no  verbal  HEENT: Lips- normal, teeth- ok , oral mucosa- moist  Neck : Mass- no, appears symmetrical, JVD- not visible  Respiratory: Respiratory effort-  Normal on oxygen , wheeze- no, crackles - no  Cardiovascular:  Ausculation- No M/R/G, Edema yes trace  Abdomen: visible mass- no, distention- no, scar- no, tenderness- no                            hepatosplenomegaly-  no  Musculoskeletal:  clubbing no,cyanosis- no , digital ischemia- no                           muscle strength- grossly normal , tone - grossly normal  Skin: rashes- no , ulcers- no, induration- no, tightening - no  Psychiatric:  Judgement and insight- normal           AAO X 3     LABS:     Recent Labs     04/13/19  1313 04/14/19  0522 04/15/19  0528   WBC 13.0* 13.5*  --    HGB 9.9* 8.9* 8.5*   HCT 30.8* 27.0* 26.0*   * 399  --      Recent Labs     04/14/19  0522 04/15/19  0525 04/16/19  0534    138 132*   K 3.9 4.0 3.2*    108 101   CO2 16* 17* 20*   BUN 30* 29* 27*   CREATININE 3.7* 3.8* 3.5*   GLUCOSE 121* 114* 99   MG 1.80  --   --

## 2019-04-16 NOTE — PROGRESS NOTES
Patient working with PT this day- having another episode of prolonged elevated HR- MD aware. EKG obtained- no change from yesterday- cont to monitor. Patient asymptomatic- did receive prn poain meds once thus far this shift states pain is minimal- 3 on scale 0-10 when having to put a number to it. uo in chair at this time- using call light approp.  Staff monitoring and alarms in place

## 2019-04-16 NOTE — PROGRESS NOTES
Patient alert to self only. Vitals stable. Pain managed with oral medication. Lee remains in place with adequate output. No significant changes overnight. Will continue to monitor.

## 2019-04-16 NOTE — PROGRESS NOTES
Hospitalist Progress Note    PCP: Fatou Ortiz MD    Date of Admission: 4/13/2019  Hospital Day: 3      Chief Complaint:   Chief Complaint   Patient presents with    Hip Pain           Hospital Course:    Admitted after a near syncopal episode and fall with left hip fracture and found to have urinary tract infection as well as urinary retention    Subjective:   Patient seen and examined  Underwent hip surgery as well as urethral dilation and quigley placement  Denies any active complaints today  Urine culture with ESBL E coli, resistant to most agents      Ancillary notes reviewed    Medications:  Reviewed    Infusion Medications    electrolyte-A       Scheduled Medications    meropenem  1 g Intravenous Q8H    sodium chloride flush  10 mL Intravenous 2 times per day    docusate sodium  100 mg Oral BID    apixaban  2.5 mg Oral BID    atorvastatin  10 mg Oral Nightly    ARIPiprazole  5 mg Oral BID    ferrous sulfate  325 mg Oral Daily with breakfast    melatonin  5 mg Oral Nightly    multivitamin  1 tablet Oral Daily    sennosides-docusate sodium  1 tablet Oral BID    tamsulosin  0.8 mg Oral Nightly     PRN Meds: sodium chloride flush, morphine **OR** morphine, HYDROcodone-acetaminophen **OR** HYDROcodone-acetaminophen, acetaminophen, ondansetron, polyethylene glycol, acetaminophen      Intake/Output Summary (Last 24 hours) at 4/16/2019 1407  Last data filed at 4/16/2019 1335  Gross per 24 hour   Intake 480 ml   Output 3445 ml   Net -2965 ml       Exam:    /69   Pulse 127   Temp 99 °F (37.2 °C) (Oral)   Resp 20   Ht 5' 10\" (1.778 m)   Wt 215 lb 3.2 oz (97.6 kg)   SpO2 94%   BMI 30.88 kg/m²       General appearance: No apparent distress, appears stated age and cooperative. HEENT: Pupils equal, round, and reactive to light. Conjunctivae/corneas clear. Neck: Supple, with full range of motion. No jugular venous distention. Trachea midline. Respiratory:  Normal respiratory effort.  Clear to auscultation, bilaterally without Rales/Wheezes/Rhonchi. Cardiovascular: Regular rate and rhythm with normal S1/S2 without murmurs, rubs or gallops. Abdomen: Soft, non-tender, non-distended with normal bowel sounds. Musculoskeletal: No clubbing, cyanosis or edema bilaterally. Full range of motion without deformity. Skin: Skin color, texture, turgor normal.  No rashes or lesions. Neurologic:  Neurovascularly intact without any focal sensory/motor deficits. Cranial nerves: II-XII intact, grossly non-focal.  Psychiatric: Alert and oriented, thought content appropriate, normal insight  Capillary Refill: Brisk,< 3 seconds   Peripheral Pulses: +2 palpable, equal bilaterally       Labs:   Recent Labs     04/14/19  0522 04/15/19  0528   WBC 13.5*  --    HGB 8.9* 8.5*   HCT 27.0* 26.0*     --      Recent Labs     04/14/19  0522 04/15/19  0525 04/16/19  0534    138 132*   K 3.9 4.0 3.2*    108 101   CO2 16* 17* 20*   BUN 30* 29* 27*   CREATININE 3.7* 3.8* 3.5*   CALCIUM 8.2* 8.2* 8.1*     No results for input(s): AST, ALT, BILIDIR, BILITOT, ALKPHOS in the last 72 hours. No results for input(s): INR in the last 72 hours. No results for input(s): Eward Pong in the last 72 hours. Urinalysis:      Lab Results   Component Value Date    NITRU Negative 04/14/2019    WBCUA  04/14/2019    BACTERIA 1+ 04/14/2019    RBCUA 20-50 04/14/2019    BLOODU LARGE 04/14/2019    SPECGRAV 1.010 04/14/2019    GLUCOSEU Negative 04/14/2019       Radiology:  XR PELVIS (1-2 VIEWS)   Final Result      Postsurgical changes from left total hip arthroplasty. No malalignment or fracture. XR HIP LEFT (1 VIEW)   Final Result      Intraoperative fluoroscopy of the left hip. Refer to the operative note for details. FLUORO FOR SURGICAL PROCEDURES   Final Result      Intraoperative fluoroscopy of the left hip. Refer to the operative note for details.       XR CHEST 1 VW   Final Result      No evidence for acute cardiopulmonary disease. XR HIP LEFT (2-3 VIEWS)   Final Result      1. Acute left femoral neck fracture              Assessment/Plan:    Active Hospital Problems    Diagnosis    Hip fracture requiring operative repair, left, closed, initial encounter ([facility] [address] Shireen Madden    UTI (urinary tract infection) [N39.0]    LIANA (acute kidney injury) ([facility] [address] [N17.9]    Personal history of schizophrenia [Z86.59]    Pure hypercholesterolemia [E78.00]     S/p Left total hip arthroplasty on 4/14  Urology following for urinary retention. Lee in place, plan for removal in 2-3 days and voiding trial  ESBL E coli UTI, will escalate to Meropenem and consult ID as pt may need long term IV abx  Sepsis ruled out with normal blood pressure and lactate. Acute kidney injury, nephrology following. Likely ATN secondary to infection. Watch closely  Continue home medications for schizophrenia  PT, OT ordered  Transient tachycardia recurrent today, suspect sec to pain vs infection. Sinus rhythm on EKG. Increase fluids rate, monitor for improvement after pain medication  If persistent, will start scheduled beta blocker     DVT Prophylaxis: Lovenox  Diet: DIET GENERAL;  Code Status: Full Code    PT/OT Eval Status: ordered    Dispo - inpatient.  DC pending medical improvement and placement    Randa Espinoza MD  2:07 PM 4/16/2019

## 2019-04-16 NOTE — PLAN OF CARE
Problem: Falls - Risk of:  Goal: Will remain free from falls  Description  Will remain free from falls  4/16/2019 0441 by Jam Ac RN  Outcome: Ongoing  Calls out appropriately. Non-skid socks on bed locked in lowest position. Bed alarm on. Call light/belongings within reach. Will continue to monitor. Problem: Risk for Impaired Skin Integrity  Goal: Tissue integrity - skin and mucous membranes  Description  Structural intactness and normal physiological function of skin and  mucous membranes. 4/16/2019 0441 by Jam Ac RN  Outcome: Ongoing  No signs of new skin breakdown. Will continue to monitor. Problem: Pain:  Goal: Control of acute pain  Description  Control of acute pain  4/16/2019 0441 by Jam Ac RN  Outcome: Ongoing  Pain treated with oral medication. Will continue to monitor.

## 2019-04-17 PROBLEM — B96.29 UTI DUE TO EXTENDED-SPECTRUM BETA LACTAMASE (ESBL) PRODUCING ESCHERICHIA COLI: Status: ACTIVE | Noted: 2019-04-17

## 2019-04-17 PROBLEM — N39.0 UTI DUE TO EXTENDED-SPECTRUM BETA LACTAMASE (ESBL) PRODUCING ESCHERICHIA COLI: Status: ACTIVE | Noted: 2019-04-17

## 2019-04-17 PROBLEM — Z16.12 UTI DUE TO EXTENDED-SPECTRUM BETA LACTAMASE (ESBL) PRODUCING ESCHERICHIA COLI: Status: ACTIVE | Noted: 2019-04-17

## 2019-04-17 LAB
ANION GAP SERPL CALCULATED.3IONS-SCNC: 15 MMOL/L (ref 3–16)
BUN BLDV-MCNC: 25 MG/DL (ref 7–20)
CALCIUM SERPL-MCNC: 8.5 MG/DL (ref 8.3–10.6)
CHLORIDE BLD-SCNC: 100 MMOL/L (ref 99–110)
CO2: 19 MMOL/L (ref 21–32)
CORTISOL TOTAL: 7.9 UG/DL
CREAT SERPL-MCNC: 3.4 MG/DL (ref 0.8–1.3)
GFR AFRICAN AMERICAN: 22
GFR NON-AFRICAN AMERICAN: 18
GLUCOSE BLD-MCNC: 108 MG/DL (ref 70–99)
POTASSIUM SERPL-SCNC: 3.2 MMOL/L (ref 3.5–5.1)
SODIUM BLD-SCNC: 134 MMOL/L (ref 136–145)

## 2019-04-17 PROCEDURE — 6370000000 HC RX 637 (ALT 250 FOR IP): Performed by: ORTHOPAEDIC SURGERY

## 2019-04-17 PROCEDURE — 80048 BASIC METABOLIC PNL TOTAL CA: CPT

## 2019-04-17 PROCEDURE — 97110 THERAPEUTIC EXERCISES: CPT

## 2019-04-17 PROCEDURE — 6360000002 HC RX W HCPCS: Performed by: INTERNAL MEDICINE

## 2019-04-17 PROCEDURE — 97116 GAIT TRAINING THERAPY: CPT

## 2019-04-17 PROCEDURE — 99223 1ST HOSP IP/OBS HIGH 75: CPT | Performed by: INTERNAL MEDICINE

## 2019-04-17 PROCEDURE — 97530 THERAPEUTIC ACTIVITIES: CPT

## 2019-04-17 PROCEDURE — 1200000000 HC SEMI PRIVATE

## 2019-04-17 PROCEDURE — 2580000003 HC RX 258: Performed by: INTERNAL MEDICINE

## 2019-04-17 PROCEDURE — 97535 SELF CARE MNGMENT TRAINING: CPT

## 2019-04-17 PROCEDURE — 6370000000 HC RX 637 (ALT 250 FOR IP): Performed by: FAMILY MEDICINE

## 2019-04-17 PROCEDURE — 2580000003 HC RX 258: Performed by: ORTHOPAEDIC SURGERY

## 2019-04-17 PROCEDURE — 36415 COLL VENOUS BLD VENIPUNCTURE: CPT

## 2019-04-17 PROCEDURE — 82533 TOTAL CORTISOL: CPT

## 2019-04-17 RX ADMIN — APIXABAN 2.5 MG: 2.5 TABLET, FILM COATED ORAL at 09:23

## 2019-04-17 RX ADMIN — Medication 5 MG: at 20:33

## 2019-04-17 RX ADMIN — Medication 10 ML: at 09:24

## 2019-04-17 RX ADMIN — DOCUSATE SODIUM 100 MG: 100 CAPSULE, LIQUID FILLED ORAL at 20:33

## 2019-04-17 RX ADMIN — SODIUM CHLORIDE, SODIUM GLUCONATE, SODIUM ACETATE, POTASSIUM CHLORIDE AND MAGNESIUM CHLORIDE 1000 ML: 526; 502; 368; 37; 30 INJECTION, SOLUTION INTRAVENOUS at 17:42

## 2019-04-17 RX ADMIN — MEROPENEM 1 G: 1 INJECTION, POWDER, FOR SOLUTION INTRAVENOUS at 12:15

## 2019-04-17 RX ADMIN — SENNOSIDES, DOCUSATE SODIUM 1 TABLET: 50; 8.6 TABLET, FILM COATED ORAL at 20:33

## 2019-04-17 RX ADMIN — ARIPIPRAZOLE 5 MG: 5 TABLET ORAL at 20:33

## 2019-04-17 RX ADMIN — TAMSULOSIN HYDROCHLORIDE 0.8 MG: 0.4 CAPSULE ORAL at 20:33

## 2019-04-17 RX ADMIN — HYDROCODONE BITARTRATE AND ACETAMINOPHEN 1 TABLET: 10; 325 TABLET ORAL at 17:42

## 2019-04-17 RX ADMIN — ARIPIPRAZOLE 5 MG: 5 TABLET ORAL at 09:23

## 2019-04-17 RX ADMIN — Medication 325 MG: at 09:23

## 2019-04-17 RX ADMIN — THERA TABS 1 TABLET: TAB at 09:23

## 2019-04-17 RX ADMIN — APIXABAN 2.5 MG: 2.5 TABLET, FILM COATED ORAL at 20:33

## 2019-04-17 RX ADMIN — HYDROCODONE BITARTRATE AND ACETAMINOPHEN 1 TABLET: 10; 325 TABLET ORAL at 09:23

## 2019-04-17 RX ADMIN — ATORVASTATIN CALCIUM 10 MG: 10 TABLET, FILM COATED ORAL at 20:33

## 2019-04-17 RX ADMIN — MEROPENEM 1 G: 1 INJECTION, POWDER, FOR SOLUTION INTRAVENOUS at 03:55

## 2019-04-17 ASSESSMENT — PAIN DESCRIPTION - PAIN TYPE
TYPE: SURGICAL PAIN
TYPE: SURGICAL PAIN

## 2019-04-17 ASSESSMENT — PAIN SCALES - GENERAL
PAINLEVEL_OUTOF10: 6
PAINLEVEL_OUTOF10: 6
PAINLEVEL_OUTOF10: 0
PAINLEVEL_OUTOF10: 5
PAINLEVEL_OUTOF10: 3
PAINLEVEL_OUTOF10: 2

## 2019-04-17 ASSESSMENT — PAIN DESCRIPTION - LOCATION
LOCATION: HIP
LOCATION: HIP

## 2019-04-17 ASSESSMENT — PAIN DESCRIPTION - ORIENTATION
ORIENTATION: LEFT
ORIENTATION: LEFT

## 2019-04-17 ASSESSMENT — PAIN DESCRIPTION - ONSET
ONSET: GRADUAL
ONSET: GRADUAL

## 2019-04-17 ASSESSMENT — PAIN DESCRIPTION - PROGRESSION
CLINICAL_PROGRESSION: NOT CHANGED
CLINICAL_PROGRESSION: NOT CHANGED

## 2019-04-17 ASSESSMENT — PAIN DESCRIPTION - DESCRIPTORS
DESCRIPTORS: ACHING
DESCRIPTORS: ACHING

## 2019-04-17 ASSESSMENT — PAIN DESCRIPTION - FREQUENCY
FREQUENCY: CONTINUOUS
FREQUENCY: CONTINUOUS

## 2019-04-17 NOTE — PROGRESS NOTES
Hospitalist Progress Note    PCP: Kristal Morris MD    Date of Admission: 4/13/2019  Hospital Day: 4      Chief Complaint:   Chief Complaint   Patient presents with    Hip Pain           Hospital Course:    Admitted after a near syncopal episode and fall with left hip fracture and found to have urinary tract infection as well as urinary retention    Subjective:   Patient seen and examined  No overnight events, no new complaints  Lee in place      Ancillary notes reviewed    Medications:  Reviewed    Infusion Medications    electrolyte-A 1,000 mL (04/16/19 2058)     Scheduled Medications    meropenem  1 g Intravenous Q8H    sodium chloride flush  10 mL Intravenous 2 times per day    docusate sodium  100 mg Oral BID    apixaban  2.5 mg Oral BID    atorvastatin  10 mg Oral Nightly    ARIPiprazole  5 mg Oral BID    ferrous sulfate  325 mg Oral Daily with breakfast    melatonin  5 mg Oral Nightly    multivitamin  1 tablet Oral Daily    sennosides-docusate sodium  1 tablet Oral BID    tamsulosin  0.8 mg Oral Nightly     PRN Meds: sodium chloride flush, morphine **OR** morphine, HYDROcodone-acetaminophen **OR** HYDROcodone-acetaminophen, acetaminophen, ondansetron, polyethylene glycol, acetaminophen      Intake/Output Summary (Last 24 hours) at 4/17/2019 1421  Last data filed at 4/17/2019 1126  Gross per 24 hour   Intake --   Output 3825 ml   Net -3825 ml       Exam:    BP 92/67   Pulse 115   Temp 98.3 °F (36.8 °C) (Oral)   Resp 16   Ht 5' 10\" (1.778 m)   Wt 215 lb 3.2 oz (97.6 kg)   SpO2 92%   BMI 30.88 kg/m²       General appearance: No apparent distress, appears stated age and cooperative. HEENT: Pupils equal, round, and reactive to light. Conjunctivae/corneas clear. Neck: Supple, with full range of motion. No jugular venous distention. Trachea midline. Respiratory:  Normal respiratory effort. Clear to auscultation, bilaterally without Rales/Wheezes/Rhonchi.   Cardiovascular: Regular rate and rhythm with normal S1/S2 without murmurs, rubs or gallops. Abdomen: Soft, non-tender, non-distended with normal bowel sounds. Musculoskeletal: No clubbing, cyanosis or edema bilaterally. Full range of motion without deformity. Skin: Skin color, texture, turgor normal.  No rashes or lesions. Neurologic:  Neurovascularly intact without any focal sensory/motor deficits. Cranial nerves: II-XII intact, grossly non-focal.  Psychiatric: Alert and oriented, thought content appropriate, normal insight  Capillary Refill: Brisk,< 3 seconds   Peripheral Pulses: +2 palpable, equal bilaterally       Labs:   Recent Labs     04/15/19  0528   HGB 8.5*   HCT 26.0*     Recent Labs     04/15/19  0525 04/16/19  0534 04/17/19  0542    132* 134*   K 4.0 3.2* 3.2*    101 100   CO2 17* 20* 19*   BUN 29* 27* 25*   CREATININE 3.8* 3.5* 3.4*   CALCIUM 8.2* 8.1* 8.5     No results for input(s): AST, ALT, BILIDIR, BILITOT, ALKPHOS in the last 72 hours. No results for input(s): INR in the last 72 hours. No results for input(s): Sabine Harmony in the last 72 hours. Urinalysis:      Lab Results   Component Value Date    NITRU Negative 04/14/2019    WBCUA  04/14/2019    BACTERIA 1+ 04/14/2019    RBCUA 20-50 04/14/2019    BLOODU LARGE 04/14/2019    SPECGRAV 1.010 04/14/2019    GLUCOSEU Negative 04/14/2019       Radiology:  XR PELVIS (1-2 VIEWS)   Final Result      Postsurgical changes from left total hip arthroplasty. No malalignment or fracture. XR HIP LEFT (1 VIEW)   Final Result      Intraoperative fluoroscopy of the left hip. Refer to the operative note for details. FLUORO FOR SURGICAL PROCEDURES   Final Result      Intraoperative fluoroscopy of the left hip. Refer to the operative note for details. XR CHEST 1 VW   Final Result      No evidence for acute cardiopulmonary disease. XR HIP LEFT (2-3 VIEWS)   Final Result      1.  Acute left femoral neck fracture Assessment/Plan:    Active Hospital Problems    Diagnosis    Hip fracture requiring operative repair, left, closed, initial encounter ([facility] [address] Prudence Don    UTI (urinary tract infection) [N39.0]    LIANA (acute kidney injury) ([facility] [address] [N17.9]    Personal history of schizophrenia [Z86.59]    Pure hypercholesterolemia [E78.00]     S/p Left total hip arthroplasty on 4/14  Urology following for urinary retention. Lee in place, plan for removal tomorrow AM and voiding trial  ESBL E coli UTI, on meropenem, ID consulted  Sepsis ruled out with normal blood pressure and lactate. Acute kidney injury, nephrology following. Likely ATN secondary to infection. Watch closely. Cr improving  Continue home medications for schizophrenia  PT, OT ordered  Transient tachycardia, suspect sec to pain vs infection. Sinus rhythm on EKG. HR continues to be 110's range. Will avoid BB due to borderline BP      DVT Prophylaxis: Lovenox  Diet: DIET GENERAL;  Code Status: Full Code    PT/OT Eval Status: ordered    Dispo - inpatient.  DC pending medical improvement and placement    Venita Reece MD  2:21 PM 4/17/2019

## 2019-04-17 NOTE — PROGRESS NOTES
Department of Orthopedic Surgery     Progress Note    Subjective:   Admit Day:4/13/2019    Patient is comfortable appearing. Denies chest pain, shortness of air or calf pain. Tolerating PO. Objective[de-identified]    height is 5' 10\" (1.778 m) and weight is 215 lb 3.2 oz (97.6 kg). His oral temperature is 98.6 °F (37 °C). His blood pressure is 124/65 and his pulse is 91. His respiration is 18 and oxygen saturation is 93%. General:  No acute distress. Some SOB when speaking. Operative Incision:  Dressing is clean, dry and intact. Operative Extremity:  - Motor function intact to Tibialis Anterior, Gastroc-Soleus Complex, Extensor Hallucis Longus, and Flexor Hallucis Longus.  -  Sensation intact to light touch in sural, saphenous, superficial peroneal, deep peroneal and tibial nerve distributions. -  Toes are wwp with a palpable dorsalis pedis pulse. Negative Edgard's Sign Bilaterally    Labs:  Lab Results   Component Value Date    WBC 13.5 04/14/2019    HGB 8.5 04/15/2019    HCT 26.0 04/15/2019     04/14/2019       Lab Results   Component Value Date    INR 1.26 04/13/2019       Lab Results   Component Value Date     04/17/2019    K 3.2 04/17/2019    K 3.9 04/14/2019    CO2 19 04/17/2019    BUN 25 04/17/2019    CREATININE 3.4 04/17/2019    CALCIUM 8.5 04/17/2019       Assessment:   Principal Problem:    Hip fracture requiring operative repair, left, closed, initial encounter ([facility] [address]  Active Problems:    Pure hypercholesterolemia    Personal history of schizophrenia    UTI (urinary tract infection)    LIANA (acute kidney injury) ([facility] [address]  Resolved Problems:    * No resolved hospital problems. *      POD # 3 s/p Left Anterior MARIBEL. Patient lying comfortably in bed this AM. Prevena wound vac in place. No complaints of pain. Able to demonstrate strong dorsiflexion. Stable from ortho standpoint. Ok to be dc. Will sign off. Plan:   1. Weightbearing as tolerated   2. DVT Prophylaxis:  Eliquis  3.   PT/OT per protocol  4. Pain control: per protocol  5. Sign off    Disposition:  Home vs SNF pending mobility/progress with therapy and medical stability.

## 2019-04-17 NOTE — PROGRESS NOTES
Occupational Therapy  Facility/Department: [facility] 5T ORTHO/NEURO  Daily Treatment Note  NAME: Rm Luo  : 1955  MRN: [id number]    Date of Service: 2019    Discharge Recommendations:    Rm Luo scored a 17/24 on the AM-PAC ADL Inpatient form. Current research shows that an AM-PAC score of 17 or less is typically not associated with a discharge to the patient's home setting. Based on the patients AM-PAC score and their current ADL deficits, it is recommended that the patient have 3-5 sessions per week of Occupational Therapy at d/c to increase the patients independence. Assessment   Performance deficits / Impairments: Decreased functional mobility ; Decreased ADL status; Decreased endurance;Decreased balance  Assessment: Pt demonstrating functional mobility in room with RW and CGA with slow steady gait. Pt completed UE dressing with Min A secondary to IV lines and ties. Pt requiring step by step command for completion of activites. Pt cooperative and motivated throughout session. Pt would benefit from continued OT per POC. Treatment Diagnosis: Decreased activity tolerance, impaired ADLs and mobility  Patient Education: Role of OT, d/c planning, activity promotion, hip precations, safe RW technique - pt demo understanding will need reinforcment  REQUIRES OT FOLLOW UP: Yes  Activity Tolerance  Activity Tolerance: Patient Tolerated treatment well  Safety Devices  Safety Devices in place: Yes  Type of devices: Left in chair;Call light within reach; Chair alarm in place;Nurse notified;Gait belt         Restrictions  Position Activity Restriction  Other position/activity restrictions: full weight bearing as tolerated, anterior hip precautions  Subjective   General  Chart Reviewed: Yes  Patient assessed for rehabilitation services?: Yes  Additional Pertinent Hx: 75 y.o. M admitted  after fall resulting in L displaced femoral neck fx. s/p L MARIBEL and cysto for quigley placement .  PMHx OT)  Short term goals  Time Frame for Short term goals: by D/C  Short term goal 1: Transfer to/from commode/chair with CGA - Not met  Short term goal 2:  Increase standing tolerance to 5 min with CGA for self care - Not met  Patient Goals   Patient goals : to get back to walking       Therapy Time   Individual Concurrent Group Co-treatment   Time In 1015         Time Out 1053         Minutes 38         Timed Code Treatment Minutes: 38 Minutes   Total Treatment Minutes: 38 minutes    [address] [place] [address], Ne, DAN/L  ([facility], OTR/L, 8024)

## 2019-04-17 NOTE — PROGRESS NOTES
Meropenem 1 g IV q8h ordered for patient. This medication is renally eliminated. Patient is borderline for dose adjustment between 1 g IV q12h and 500 mg IV q12h (see recommended dosing below). However, considering that SCr has been trending down, will change to meropenem 1 g IV q12h per renal dose adjustment policy. Estimated Creatinine Clearance: 25 mL/min (A) (based on SCr of 3.4 mg/dL (H)).  recommended dosing:  CrCl >50 mL/minute: No dosage adjustment necessary. CrCl 26 to 50 mL/minute: Administer recommended dose based on indication every 12 hours  CrCl 10 to 25 mL/minute: Administer one-half recommended dose based on indication every 12 hours  CrCl <10 mL/minute: Administer one-half recommended dose based on indication every 24 hours    Pharmacy will continue to monitor renal function and adjust dose as necessary. Please call with any questions. Thanks!   Kassandra Franco, PharmD, [facility]  4/17/2019 4:42 PM

## 2019-04-17 NOTE — PROGRESS NOTES
appetite-No  Confusion- no  Urinary complaints- no  Any other complaints- no  All other ROS are reviewed and are Negative    Vitals :     Vitals:    04/17/19 1440   BP: (!) 97/59   Pulse: 90   Resp: 16   Temp: 97.7 °F (36.5 °C)   SpO2: 92%       I & O :       Intake/Output Summary (Last 24 hours) at 4/17/2019 1500  Last data filed at 4/17/2019 1126  Gross per 24 hour   Intake --   Output 3825 ml   Net -3825 ml        Physical Examination :     General appearance: Anxious- yes, distressed- no, in good spirits- no  Verbal, sitting up on chair  HEENT: Lips- normal, teeth- ok , oral mucosa- moist  Neck : Mass- no, appears symmetrical, JVD- not visible  Respiratory: Respiratory effort-  Normal on oxygen , wheeze- no, crackles - no  Cardiovascular:  Ausculation- No M/R/G, Edema yes trace  Abdomen: visible mass- no, distention- no, scar- no, tenderness- no                            hepatosplenomegaly-  no  Musculoskeletal:  clubbing no,cyanosis- no , digital ischemia- no                           muscle strength- grossly normal , tone - grossly normal  Skin: rashes- no , ulcers- no, induration- no, tightening - no  Psychiatric:  Judgement and insight- normal           AAO X 3     LABS:     Recent Labs     04/15/19  0528   HGB 8.5*   HCT 26.0*     Recent Labs     04/15/19  0525 04/16/19  0534 04/17/19  0542    132* 134*   K 4.0 3.2* 3.2*    101 100   CO2 17* 20* 19*   BUN 29* 27* 25*   CREATININE 3.8* 3.5* 3.4*   GLUCOSE 114* 99 108*

## 2019-04-17 NOTE — PLAN OF CARE
Problem: Falls - Risk of:  Goal: Will remain free from falls  Description  Patient is a high fall risk, fall risk precautions enforced. Patient in bed with alarm and nonskid socks on, 3/4 side rails up and bed locked in lowest position. Call light, belongings, and bedside table within reach. Patient educated on using call light and instructed to call out for assistance when getting out of bed, patient verbalized understanding. Patient calls out approprietly and remains free of falls.     Outcome: Ongoing

## 2019-04-17 NOTE — CONSULTS
Infectious Diseases Inpatient Consult Note    Reason for Consult  E coli, ESBL UTI  Requesting Physician:   Dr Rayne Lama  Primary Care Physician:  Tomas Her MD  History Obtained From:   Pt, EPIC    Admit Date: 4/13/2019  Hospital Day: 5    CHIEF COMPLAINT:       Chief Complaint   Patient presents with    Hip Pain       HISTORY OF PRESENT ILLNESS:      76 yo man with hx mental d/o. Lives at a nursing facility. Presents with fall / syncopy vs near syncopal episode. C/o L hip pain. In ED / admit 4/13 - afebrile, low BP, WBC 13, Cr 3.8.  UA mod LE, micro >100 WBC / HPF  X-ray + L hip femoral neck fx.        involved to place quigley and urethral dilatation - 4/14  Ortho ORIF / L MARIBEL - 4/14    Urine cult + ESBL Ecoli  Started on Meropenem on 4/16    Pt reports difficulty urinating with reduced stream, no dysuria, no change in urine (odor / smell)        Past Medical History:    Past Medical History:   Diagnosis Date    Encounter for long-term (current) use of other medications     ESBL (extended spectrum beta-lactamase) producing bacteria infection 04/14/2019    urine    History of colonoscopy with polypectomy 6/06    Hypercholesterolemia     Neurodermatitis     Personal history of mental disorder     Schizophrenia    Personal history of schizophrenia 7/26/2010    Pure hypercholesterolemia 7/26/2010    Rosacea     acne    Smoker     Special screening for malignant neoplasm of prostate     Tobacco abuse     Tobacco use disorder 7/26/2010       Past Surgical History:    Past Surgical History:   Procedure Laterality Date    CYSTOSCOPY  4/14/2019    CYSTOSCOPY, URETHRAL DILATION performed by Sonna Kanner, MD at [address]      repair    HEMIARTHROPLASTY HIP Left 4/14/2019    LEFT TOTAL HIP ARTHROPLASTY performed by Maynor Banks MD at [address]       Current Medications:     [START ON 4/18/2019] meropenem  1 g Intravenous Q12H    sodium chloride flush  10 mL Intravenous 2 times per day    docusate sodium  100 mg Oral BID    apixaban  2.5 mg Oral BID    atorvastatin  10 mg Oral Nightly    ARIPiprazole  5 mg Oral BID    ferrous sulfate  325 mg Oral Daily with breakfast    melatonin  5 mg Oral Nightly    multivitamin  1 tablet Oral Daily    sennosides-docusate sodium  1 tablet Oral BID    tamsulosin  0.8 mg Oral Nightly       Allergies:  Patient has no known allergies. Social History:    TOBACCO:    None - past   ETOH:    None   DRUGS:   None   MARITAL STATUS:   Not   OCCUPATION:   None     Patient lives ECF    Family History:   No immunodeficiency    REVIEW OF SYSTEMS:    No fever / chills / sweats. No weight loss. No visual change, eye pain, eye discharge. No oral lesion, sore throat, dysphagia. Denies cough / sputum. Denies chest pain, palpitations. Denies n / v / abd pain. No diarrhea. No dysuria.  +  change in urinary function. Denies joint swelling or pain. No myalgia, arthralgia. Denies skin changes, itching  Denies focal weakness, sensory change or other neurologic symptom    Denies new / worse depression, psychiatric symptoms  Denies any Endocrine or Hematologic symptoms    PHYSICAL EXAM:      Vitals:    BP (!) 97/59   Pulse 90   Temp 97.7 °F (36.5 °C) (Oral)   Resp 16   Ht 5' 10\" (1.778 m)   Wt 215 lb 3.2 oz (97.6 kg)   SpO2 92%   BMI 30.88 kg/m²     GENERAL: No apparent distress.     HEENT: Membranes moist, no oral lesion  NECK:  Supple  LUNGS: Clear b/l, no rales, no dullness  CARDIAC: RRR, no murmur appreciated  ABD:  + BS, soft / NT - no SP tenderness, no CVAT  EXT:  No rash, no edema, no lesions  NEURO: No focal neurologic findings  PSYCH: Orientation, sensorium, mood normal  LINES:  Peripheral iv    DATA:    Lab Results   Component Value Date    WBC 13.5 (H) 04/14/2019    HGB 8.5 (L) 04/15/2019    HCT 26.0 (L) 04/15/2019    MCV 88.2 04/14/2019     04/14/2019     Lab Results   Component Value Date    CREATININE 3.4 (H) 2019    BUN 25 (H) 2019     (L) 2019    K 3.2 (L) 2019     2019    CO2 19 (L) 2019       Hepatic Function Panel:   Lab Results   Component Value Date    ALKPHOS 199 2017    ALT 39 2017    AST 30 2017    PROT 6.9 2017    BILITOT 1.5 2017    BILIDIR 0.6 2017    IBILI 0.9 2017    LABALBU 3.0 2017       Micro:   Urine cult - >100k E coli  Escherichia coli (esbl)   Antibiotic Interpretation PAM Status    amoxicillin-clavulanate Intermediate 16/8 mcg/mL     ampicillin Resistant >16 mcg/mL     ceFAZolin Resistant >16 mcg/mL     cefepime Resistant >16 mcg/mL     cefTRIAXone Resistant >32 mcg/mL     ciprofloxacin Resistant >2 mcg/mL     gentamicin Resistant >8 mcg/mL     levofloxacin Resistant >4 mcg/mL     meropenem Sensitive <=0.25 mcg/mL     nitrofurantoin Sensitive <=16 mcg/mL     tetracycline Resistant >8 mcg/mL     tobramycin Resistant >8 mcg/mL     trimethoprim-sulfamethoxazole Sensitive <=0.5/9.5 mcg/mL        BC x 2 no growth to date    Imagin/13 L hip:   There is acute left femoral neck fracture with mild displacement and angulation. Pelvic ring is intact. Mild degenerative spondylosis of visualized lumbar spine. IMPRESSION:      Patient Active Problem List   Diagnosis    Pure hypercholesterolemia    Tobacco use disorder    Personal history of schizophrenia    Rosacea    Pneumonia    Hip fracture requiring operative repair, left, closed, initial encounter ([facility] [facility]    UTI (urinary tract infection)    LIANA (acute kidney injury) ([facility] [address] [facility]       Med co-morbidities - psych, chol  Fall, hip fx, s/p MARIBEL   LIANA / CKD - likely chronic obstructive nephropathy    UTI vs bacteriuria - E coli, ESBL, on meropenem, d#2. 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   I feel pt should have course of treatment     RECOMMENDATIONS:    Cont meropenem, appreciate pharmacy dose adjustment   Midline and treat for 7 - 10 day course   See KUNAL - can use ertapenem after discharge (once daily dosing)    Discussed with pt  Mendel Alamin, MD     INFUSION ORDERS:  Invanz 500 mg iv daily through 4/24  - First dose given in hosp  - Check CBC w diff, CMP, ESR, CRP every Mon or Tue - FAX result to [phone]  - Call with any change in status, transfer out of facility or to hospital - call [phone]

## 2019-04-17 NOTE — PROGRESS NOTES
Physical Therapy  Facility/Department: [facility] 5T ORTHO/NEURO  Daily Treatment Note  NAME: Meghan Michael  : 1955  MRN: [id number]    Date of Service: 2019  Assessment: Pt progressing with functional mobility. Cont to recommend further inpt PT upon D/C. Pt cooperative and demonstrates good participation. Will continue to follow. Discharge Recommendations: Meghan Michael scored a  on the AM-PAC short mobility form. Current research shows that an AM-PAC score of 17 or less is typically not associated with a discharge to the patient's home setting. Based on the patients AM-PAC score and their current functional mobility deficits, it is recommended that the patient have 3-5 sessions per week of Physical Therapy at d/c to increase the patients independence. Patient would benefit from continued therapy after discharge   PT Equipment Recommendations  Equipment Needed: No  Other: defer    Patient Diagnosis(es): The primary encounter diagnosis was Closed fracture of left hip, initial encounter ([facility] [address] A diagnosis of LIANA (acute kidney injury) ([facility] [address] was also pertinent to this visit. has a past medical history of Encounter for long-term (current) use of other medications, ESBL (extended spectrum beta-lactamase) producing bacteria infection, History of colonoscopy with polypectomy, Hypercholesterolemia, Neurodermatitis, Personal history of mental disorder, Personal history of schizophrenia, Pure hypercholesterolemia, Rosacea, Smoker, Special screening for malignant neoplasm of prostate, Tobacco abuse, and Tobacco use disorder. has a past surgical history that includes Foot fracture surgery; HEMIARTHROPLASTY HIP (Left, 2019); and Cystoscopy (2019). Restrictions  Position Activity Restriction  Other position/activity restrictions: full weight bearing as tolerated, anterior hip precautions  Subjective   General  Chart Reviewed:  Yes  Additional Pertinent Hx: Pt admitted on 19 with hip fracture. Pt underwent L hemiarthroplasty on 4/12/19. H/o colonoscopy, neurodermatitis, schizophrenia, rosacea, L THR 4/14/2019. Referring Practitioner: Dr. Jack Jane  Subjective  Subjective: Pt seated in chair & agreeable to PT. Just finished working with OT. Pain Screening  Patient Currently in Pain: Yes(7/10 L hip pain after amb - pt states he was premedicated and does not want additional meds)       Orientation  Orientation  Overall Orientation Status: Impaired(oriented to self, situation, \"Congregation\", not month or year)  Cognition      Objective      Transfers  Sit to Stand: Minimal Assistance(from chair x 3 trials)  Stand to sit: Minimal Assistance(cues for safety)  Ambulation  Ambulation?: Yes  Ambulation 1  Device: Rolling Walker  Assistance: Minimal assistance  Quality of Gait: very slow blair, step-to pattern, decreased stance time & WB left LE, frequent cues for LE sequencing with walker  Distance: 45 ft  Comments: -        Exercises  Hip Flexion: x 10 B  Hip Abduction: x 10 B  Knee Long Arc Quad: x 10 bilat  Ankle Pumps: x 10 bilat                        Assessment   Body structures, Functions, Activity limitations: Decreased functional mobility   Assessment: Pt progressing with functional mobility. Cont to recommend further inpt PT upon D/C. Pt cooperative and demonstrates good participation. Will continue to follow.   Treatment Diagnosis: Decreased functional mobility related to L hip fracture  Prognosis: Good  Patient Education: transfers, gait, LE ther ex  REQUIRES PT FOLLOW UP: Yes     G-Code     OutComes Score                                                  AM-PAC Score  AM-PAC Inpatient Mobility Raw Score : 14  AM-PAC Inpatient T-Scale Score : 38.1  Mobility Inpatient CMS 0-100% Score: 61.29  Mobility Inpatient CMS G-Code Modifier : CL          Goals  Short term goals  Time Frame for Short term goals: by discharge  Short term goal 1: Bed mobility with min assist  ongoing  Short term goal 2: Sit to stand with min assist MET 4/17  New goal: Pt will transfer sit <--> stand with supervision  Short term goal 3: Pt will ambulate 30 feet with wheeled walker and CGA WBAT ongoing  Short term goal 4: Pt will perform THR HEP x 10 with min assist  MET 4/17  Patient Goals   Patient goals : None stated    Plan    Plan  Times per week: 7  Times per day: Daily  Current Treatment Recommendations: Strengthening, Functional Mobility Training, Gait Training, Transfer Training, Home Exercise Program, Safety Education & Training  Safety Devices  Type of devices: Call light within reach, Chair alarm in place, Left in chair, Nurse notified, Gait belt(ice to R hip)     Therapy Time   Individual Concurrent Group Co-treatment   Time In 1046         Time Out 1124         Minutes 38                 Timed Code Treatment Minutes:  38    Total Treatment Minutes:  38    If patient is discharged prior to next treatment, this note will serve as the discharge summary.   Karan Wright, PT, DPT  [phone]

## 2019-04-17 NOTE — PROGRESS NOTES
Patient a/o to self only. Able to follow commands. Vitals stable. Pain treated with oral medication. Lee remains in place. No significant changes over night. Will continue to monitor.

## 2019-04-17 NOTE — PROGRESS NOTES
Patient A&O3, disoriented to situation. VSS, BP soft, pt asymptomatic. Neuro checks within normal limits. Surgical hip dressing clean, dry, and intact, prevena in place with minimal output. Pain well controlled with oral medications. Patient up to the chair several times x1 and walker with GB, tolerated well. Lee remains in place with adequate output, will attempt voiding trial in the am per urology. Patient instructed to call out for needs. Will continue to monitor.

## 2019-04-17 NOTE — PLAN OF CARE
Problem: Falls - Risk of:  Goal: Will remain free from falls  Description  Will remain free from falls  4/17/2019 0127 by Frankie Martinez RN  Outcome: Ongoing  Bed locked in lowest position. Bed alarm on. Frequent rounds made. Call light/belongings within reach. Will continue to monitor. Problem: Risk for Impaired Skin Integrity  Goal: Tissue integrity - skin and mucous membranes  Description  Structural intactness and normal physiological function of skin and  mucous membranes. 4/17/2019 0127 by Frankie Martinez RN  Outcome: Ongoing  No signs of new skin breakdown. Will continue to monitor. Problem: Infection - Surgical Site:  Goal: Signs of wound healing will improve  Description  Signs of wound healing will improve  Outcome: Ongoing   No signs of infection. Will continue to monitor. Problem: Mobility - Impaired:  Goal: Achieve maximum mobility level  Description  Achieve maximum mobility level  Outcome: Ongoing  Patient remains weak. Therapy ordered. Will continue to monitor. Problem: Pain - Acute:  Goal: Pain level will decrease  Description  Pain level will decrease  4/17/2019 0127 by Frankie Martinez RN  Outcome: Ongoing  Pain treated with oral medication. Will continue to monitor.

## 2019-04-18 LAB
ANION GAP SERPL CALCULATED.3IONS-SCNC: 14 MMOL/L (ref 3–16)
BLOOD CULTURE, ROUTINE: NORMAL
BUN BLDV-MCNC: 22 MG/DL (ref 7–20)
CALCIUM SERPL-MCNC: 8.2 MG/DL (ref 8.3–10.6)
CHLORIDE BLD-SCNC: 101 MMOL/L (ref 99–110)
CO2: 21 MMOL/L (ref 21–32)
CORTISOL TOTAL: 9.5 UG/DL
CREAT SERPL-MCNC: 3 MG/DL (ref 0.8–1.3)
CULTURE, BLOOD 2: NORMAL
GFR AFRICAN AMERICAN: 25
GFR NON-AFRICAN AMERICAN: 21
GLUCOSE BLD-MCNC: 98 MG/DL (ref 70–99)
POTASSIUM SERPL-SCNC: 3 MMOL/L (ref 3.5–5.1)
SODIUM BLD-SCNC: 136 MMOL/L (ref 136–145)

## 2019-04-18 PROCEDURE — 2580000003 HC RX 258: Performed by: INTERNAL MEDICINE

## 2019-04-18 PROCEDURE — 6370000000 HC RX 637 (ALT 250 FOR IP): Performed by: FAMILY MEDICINE

## 2019-04-18 PROCEDURE — 97535 SELF CARE MNGMENT TRAINING: CPT

## 2019-04-18 PROCEDURE — 97116 GAIT TRAINING THERAPY: CPT

## 2019-04-18 PROCEDURE — 6370000000 HC RX 637 (ALT 250 FOR IP): Performed by: ORTHOPAEDIC SURGERY

## 2019-04-18 PROCEDURE — 51798 US URINE CAPACITY MEASURE: CPT

## 2019-04-18 PROCEDURE — 2580000003 HC RX 258: Performed by: ORTHOPAEDIC SURGERY

## 2019-04-18 PROCEDURE — 97530 THERAPEUTIC ACTIVITIES: CPT

## 2019-04-18 PROCEDURE — 6360000002 HC RX W HCPCS: Performed by: INTERNAL MEDICINE

## 2019-04-18 PROCEDURE — 80048 BASIC METABOLIC PNL TOTAL CA: CPT

## 2019-04-18 PROCEDURE — 1200000000 HC SEMI PRIVATE

## 2019-04-18 PROCEDURE — 82533 TOTAL CORTISOL: CPT

## 2019-04-18 PROCEDURE — 6370000000 HC RX 637 (ALT 250 FOR IP): Performed by: INTERNAL MEDICINE

## 2019-04-18 PROCEDURE — 97110 THERAPEUTIC EXERCISES: CPT

## 2019-04-18 PROCEDURE — 36415 COLL VENOUS BLD VENIPUNCTURE: CPT

## 2019-04-18 RX ORDER — SODIUM CHLORIDE, SODIUM GLUCONATE, SODIUM ACETATE, POTASSIUM CHLORIDE AND MAGNESIUM CHLORIDE 526; 502; 368; 37; 30 MG/100ML; MG/100ML; MG/100ML; MG/100ML; MG/100ML
1000 INJECTION, SOLUTION INTRAVENOUS CONTINUOUS
Status: DISCONTINUED | OUTPATIENT
Start: 2019-04-18 | End: 2019-04-20 | Stop reason: HOSPADM

## 2019-04-18 RX ORDER — POTASSIUM CHLORIDE 20 MEQ/1
40 TABLET, EXTENDED RELEASE ORAL ONCE
Status: COMPLETED | OUTPATIENT
Start: 2019-04-18 | End: 2019-04-18

## 2019-04-18 RX ADMIN — Medication 5 MG: at 20:37

## 2019-04-18 RX ADMIN — Medication 10 ML: at 11:21

## 2019-04-18 RX ADMIN — THERA TABS 1 TABLET: TAB at 11:19

## 2019-04-18 RX ADMIN — Medication 325 MG: at 11:19

## 2019-04-18 RX ADMIN — ATORVASTATIN CALCIUM 10 MG: 10 TABLET, FILM COATED ORAL at 20:37

## 2019-04-18 RX ADMIN — POTASSIUM CHLORIDE 40 MEQ: 20 TABLET, EXTENDED RELEASE ORAL at 11:19

## 2019-04-18 RX ADMIN — MEROPENEM 1 G: 1 INJECTION, POWDER, FOR SOLUTION INTRAVENOUS at 01:45

## 2019-04-18 RX ADMIN — SODIUM CHLORIDE, SODIUM GLUCONATE, SODIUM ACETATE, POTASSIUM CHLORIDE AND MAGNESIUM CHLORIDE 1000 ML: 526; 502; 368; 37; 30 INJECTION, SOLUTION INTRAVENOUS at 21:47

## 2019-04-18 RX ADMIN — HYDROCODONE BITARTRATE AND ACETAMINOPHEN 1 TABLET: 10; 325 TABLET ORAL at 11:19

## 2019-04-18 RX ADMIN — APIXABAN 2.5 MG: 2.5 TABLET, FILM COATED ORAL at 11:20

## 2019-04-18 RX ADMIN — ARIPIPRAZOLE 5 MG: 5 TABLET ORAL at 20:37

## 2019-04-18 RX ADMIN — ARIPIPRAZOLE 5 MG: 5 TABLET ORAL at 11:20

## 2019-04-18 RX ADMIN — MEROPENEM 1 G: 1 INJECTION, POWDER, FOR SOLUTION INTRAVENOUS at 13:30

## 2019-04-18 RX ADMIN — TAMSULOSIN HYDROCHLORIDE 0.8 MG: 0.4 CAPSULE ORAL at 20:37

## 2019-04-18 ASSESSMENT — PAIN DESCRIPTION - DESCRIPTORS: DESCRIPTORS: ACHING

## 2019-04-18 ASSESSMENT — PAIN SCALES - GENERAL
PAINLEVEL_OUTOF10: 2
PAINLEVEL_OUTOF10: 5
PAINLEVEL_OUTOF10: 0
PAINLEVEL_OUTOF10: 0

## 2019-04-18 ASSESSMENT — PAIN SCALES - PAIN ASSESSMENT IN ADVANCED DEMENTIA (PAINAD)
CONSOLABILITY: 0
BODYLANGUAGE: 0
TOTALSCORE: 0
BREATHING: 0
NEGVOCALIZATION: 0
FACIALEXPRESSION: 0

## 2019-04-18 ASSESSMENT — PAIN DESCRIPTION - ONSET: ONSET: GRADUAL

## 2019-04-18 ASSESSMENT — PAIN DESCRIPTION - PROGRESSION: CLINICAL_PROGRESSION: NOT CHANGED

## 2019-04-18 ASSESSMENT — PAIN DESCRIPTION - LOCATION: LOCATION: HIP

## 2019-04-18 ASSESSMENT — PAIN DESCRIPTION - PAIN TYPE: TYPE: SURGICAL PAIN

## 2019-04-18 ASSESSMENT — PAIN DESCRIPTION - FREQUENCY: FREQUENCY: CONTINUOUS

## 2019-04-18 ASSESSMENT — PAIN DESCRIPTION - ORIENTATION: ORIENTATION: LEFT

## 2019-04-18 NOTE — PROGRESS NOTES
Leonel  Subjective  Subjective: Pt seated in chair & agreeable to PT. Pain Screening  Patient Currently in Pain: Denies  Vital Signs  Patient Currently in Pain: Denies               Objective      Transfers  Sit to Stand: Minimal Assistance(from chair)  Stand to sit: Contact guard assistance(verbal cues)  Ambulation  Ambulation?: Yes  Ambulation 1  Surface: level tile  Device: Rolling Walker  Assistance: Contact guard assistance  Quality of Gait: slow blair, step-to pattern progressing to step-through at times, decreased stance time & WB left LE. Distance: 125 ft  Comments: no LOB. Exercises  Quad Sets: x 15 bilat  Heelslides: x 15 left LE  Knee Long Arc Quad: x 15 bilat  Knee Short Arc Quad: x 15 bilat  Ankle Pumps: x 15 bilat                        Assessment   Body structures, Functions, Activity limitations: Decreased functional mobility   Assessment: Pt continues to make progress with transfers & gait. Cont to recommend further inpt PT upon D/C. Cont PT while here per plan of care to maximize mobility. Treatment Diagnosis: Decreased functional mobility related to L hip fracture  Prognosis: Good  Patient Education: transfers, gait, LE ther ex  REQUIRES PT FOLLOW UP: Yes  Activity Tolerance  Activity Tolerance: Patient Tolerated treatment well                                                        AM-PAC Score  AM-PAC Inpatient Mobility Raw Score : 15  AM-PAC Inpatient T-Scale Score : 39.45  Mobility Inpatient CMS 0-100% Score: 57.7  Mobility Inpatient CMS G-Code Modifier : CK          Goals  Short term goals  Time Frame for Short term goals: by discharge  Short term goal 1: Bed mobility with min assist  ongoing  Short term goal 2: Sit to stand with min assist MET 4/17  New goal: Pt will transfer sit <--> stand with supervision  Short term goal 3: Pt will ambulate 30 feet with wheeled walker and CGA WBAT (met 4/18). new goal: Amb 150 ft with RW CGA.   Short term goal 4: Pt will perform THR HEP x 10 with min assist  MET 4/17  Patient Goals   Patient goals : None stated    Plan    Plan  Times per week: 5-7  Times per day: Daily  Current Treatment Recommendations: Strengthening, Functional Mobility Training, Gait Training, Transfer Training, Home Exercise Program, Safety Education & Training  Safety Devices  Type of devices: Call light within reach, Chair alarm in place, Left in chair     Therapy Time   Individual Concurrent Group Co-treatment   Time In [id number]         Time Out 1420         Minutes 25               This note will serve as a discharge summary if pt discharged prior to next treatment session.   Dario Gillette, PT

## 2019-04-18 NOTE — PROGRESS NOTES
Patient A&Ox3, disoriented to situation. VSS. Neuro checks WNL, surgical dressing clean, dry, and intact. Pain controlled with oral medications. Patient voided several times after quigley removal, still retaining some urine 100-200 cc, will straight cath if >450 mL. Patient anticipating IR tomorrow and discharge back to facility. Will continue to monitor.

## 2019-04-18 NOTE — PLAN OF CARE
Problem: Risk for Impaired Skin Integrity  Goal: Tissue integrity - skin and mucous membranes  Description  Patient on specialty mattress assisted with turning and repositioning every 2 hours. Ludy care provided and moisture barrier cream applied as needed. No new skin breakdown noted this shift.     Outcome: Ongoing

## 2019-04-18 NOTE — PROGRESS NOTES
Spoke to Kenzie Knox regarding Tunneled central venous cath order. Pt will need to be NPO after MN and an order to hold Eliquis will need to be obtained. Also please clarify how many lumens. Consent with Dr. Taiwo Davis.

## 2019-04-18 NOTE — PROGRESS NOTES
Urology Attending Progress Note      Subjective: feeling better. No complaints     Vitals:  /73   Pulse 96   Temp 99 °F (37.2 °C) (Oral)   Resp 16   Ht 5' 10\" (1.778 m)   Wt 215 lb 3.2 oz (97.6 kg)   SpO2 96%   BMI 30.88 kg/m²   Temp  Av.3 °F (36.8 °C)  Min: 97.7 °F (36.5 °C)  Max: 99 °F (37.2 °C)    Intake/Output Summary (Last 24 hours) at 2019 1029  Last data filed at 2019 0935  Gross per 24 hour   Intake 720 ml   Output 3875 ml   Net -3155 ml       Exam: abd soft and and non-tender. Quigley out. No void yet. Labs:  WBC:    Lab Results   Component Value Date    WBC 13.5 2019     Hemoglobin/Hematocrit:    Lab Results   Component Value Date    HGB 8.5 04/15/2019    HCT 26.0 04/15/2019     BMP:    Lab Results   Component Value Date     2019    K 3.0 2019    K 3.9 2019     2019    CO2 21 2019    BUN 22 2019    LABALBU 3.0 2017    CREATININE 3.0 2019    CALCIUM 8.2 2019    GFRAA 25 2019    LABGLOM 21 2019     PT/INR:    Lab Results   Component Value Date    PROTIME 14.4 2019    INR 1.26 2019     PTT:    Lab Results   Component Value Date    APTT 28.2 2019   [APTT        Urine Culture:   ESBL E.coli 50K        Blood Culture:  NGTD    Antibiotic Therapy:  Merrem     Imaging: no new       Impression/Plan: 74 yo M s/p Lt hip replacement and POD#4 s/p cysto, U-dil, and quigley placement   -Cr @ 3.0 from 3.4  -continue abx  -continue flomax  -quigley removed. No void yet.  Bladder scan after first void or within 6 hours if has not voided.  -Okay to in and out cath for PVR > 450cc       agree with plan     Jr Nguyen, AIDA - CNP

## 2019-04-18 NOTE — PLAN OF CARE
Problem: Falls - Risk of:  Goal: Will remain free from falls  Description  Patient is a high fall risk, fall risk precautions enforced. Patient in bed with alarm and nonskid socks on, 3/4 side rails up and bed locked in lowest position. Call light, belongings, and bedside table within reach. Patient educated on using call light and instructed to call out for assistance when getting out of bed, patient verbalized understanding. Patient calls out approprietly and remains free of falls. 4/18/2019 0219 by Shama Matta RN  Outcome: Ongoing   Calls out appropriately. Non-skid socks on. Bed locked in lowest position. Bed alarm on. Call light/belongings within reach. Will continue to monitor. Problem: Risk for Impaired Skin Integrity  Goal: Tissue integrity - skin and mucous membranes  Description  Structural intactness and normal physiological function of skin and  mucous membranes. Outcome: Ongoing   Specialty mattress in place. Patient turns on own. No signs of skin breakdown. Will continue to monitor. Problem: Mobility - Impaired:  Goal: Achieve maximum mobility level  Description  Achieve maximum mobility level  Outcome: Ongoing   Tolerating ambulation well with walker. Will continue to monitor. Problem: Pain - Acute:  Goal: Pain level will decrease  Description  Pain level will decrease  Outcome: Ongoing  Denies pain at this time. Will continue to monitor.

## 2019-04-18 NOTE — PROGRESS NOTES
Hospitalist Progress Note    PCP: Laure Jeronimo MD    Date of Admission: 4/13/2019  Hospital Day: 5      Chief Complaint:   Chief Complaint   Patient presents with    Hip Pain           Hospital Course:    Admitted after a near syncopal episode and fall with left hip fracture and found to have urinary tract infection as well as urinary retention    Subjective:   Patient seen and examined  No overnight events, no new complaints  Lee removed, voiding trial today      Ancillary notes reviewed    Medications:  Reviewed    Infusion Medications    electrolyte-A 100 mL/hr at 04/18/19 1121     Scheduled Medications    meropenem  1 g Intravenous Q12H    sodium chloride flush  10 mL Intravenous 2 times per day    docusate sodium  100 mg Oral BID    [Held by provider] apixaban  2.5 mg Oral BID    atorvastatin  10 mg Oral Nightly    ARIPiprazole  5 mg Oral BID    ferrous sulfate  325 mg Oral Daily with breakfast    melatonin  5 mg Oral Nightly    multivitamin  1 tablet Oral Daily    sennosides-docusate sodium  1 tablet Oral BID    tamsulosin  0.8 mg Oral Nightly     PRN Meds: sodium chloride flush, morphine **OR** morphine, HYDROcodone-acetaminophen **OR** HYDROcodone-acetaminophen, acetaminophen, ondansetron, polyethylene glycol, acetaminophen      Intake/Output Summary (Last 24 hours) at 4/18/2019 1426  Last data filed at 4/18/2019 1322  Gross per 24 hour   Intake 840 ml   Output 3800 ml   Net -2960 ml       Exam:    /71   Pulse 90   Temp 98.6 °F (37 °C) (Oral)   Resp 16   Ht 5' 10\" (1.778 m)   Wt 215 lb 3.2 oz (97.6 kg)   SpO2 96%   BMI 30.88 kg/m²       General appearance: No apparent distress, appears stated age and cooperative. HEENT: Pupils equal, round, and reactive to light. Conjunctivae/corneas clear. Neck: Supple, with full range of motion. No jugular venous distention. Trachea midline. Respiratory:  Normal respiratory effort.  Clear to auscultation, bilaterally without Rales/Wheezes/Rhonchi. Cardiovascular: Regular rate and rhythm with normal S1/S2 without murmurs, rubs or gallops. Abdomen: Soft, non-tender, non-distended with normal bowel sounds. Musculoskeletal: No clubbing, cyanosis or edema bilaterally. Full range of motion without deformity. Skin: Skin color, texture, turgor normal.  No rashes or lesions. Neurologic:  Neurovascularly intact without any focal sensory/motor deficits. Cranial nerves: II-XII intact, grossly non-focal.  Psychiatric: Alert and oriented, thought content appropriate, normal insight  Capillary Refill: Brisk,< 3 seconds   Peripheral Pulses: +2 palpable, equal bilaterally       Labs:   No results for input(s): WBC, HGB, HCT, PLT in the last 72 hours. Recent Labs     04/16/19  0534 04/17/19  0542 04/18/19  0553   * 134* 136   K 3.2* 3.2* 3.0*    100 101   CO2 20* 19* 21   BUN 27* 25* 22*   CREATININE 3.5* 3.4* 3.0*   CALCIUM 8.1* 8.5 8.2*     No results for input(s): AST, ALT, BILIDIR, BILITOT, ALKPHOS in the last 72 hours. No results for input(s): INR in the last 72 hours. No results for input(s): Macrina Lacrosse in the last 72 hours. Urinalysis:      Lab Results   Component Value Date    NITRU Negative 04/14/2019    WBCUA  04/14/2019    BACTERIA 1+ 04/14/2019    RBCUA 20-50 04/14/2019    BLOODU LARGE 04/14/2019    SPECGRAV 1.010 04/14/2019    GLUCOSEU Negative 04/14/2019       Radiology:  XR PELVIS (1-2 VIEWS)   Final Result      Postsurgical changes from left total hip arthroplasty. No malalignment or fracture. XR HIP LEFT (1 VIEW)   Final Result      Intraoperative fluoroscopy of the left hip. Refer to the operative note for details. FLUORO FOR SURGICAL PROCEDURES   Final Result      Intraoperative fluoroscopy of the left hip. Refer to the operative note for details. XR CHEST 1 VW   Final Result      No evidence for acute cardiopulmonary disease.              XR HIP LEFT (2-3 VIEWS)   Final Result 1. Acute left femoral neck fracture              Assessment/Plan:    Active Hospital Problems    Diagnosis    UTI due to extended-spectrum beta lactamase (ESBL) producing Escherichia coli [N39.0, B96.29, Z16.12]    Hip fracture requiring operative repair, left, closed, initial encounter ([facility] [address] [C70.514P]    Complicated UTI (urinary tract infection) [N39.0]    LIANA (acute kidney injury) ([facility] [address] [N17.9]    Personal history of schizophrenia [Z86.59]    Pure hypercholesterolemia [E78.00]     S/p Left total hip arthroplasty on 4/14  Urology following for urinary retention. Lee removed today, voiding trial today  ESBL E coli UTI, on meropenem, ID consulted, appreciate recs  Sepsis ruled out with normal blood pressure and lactate. Acute kidney injury, nephrology following. Likely ATN secondary to infection. Watch closely. Cr improving  Continue IV fluids for 1 more day, d/w Dr Jerad Lott home medications for schizophrenia  PT, OT ordered  Transient tachycardia, suspect sec to pain vs infection. Improved. Sinus rhythm on EKG. Will avoid BB due to borderline BP  DC tomorrow after tunneled central line placement. DVT Prophylaxis: Lovenox  Diet: DIET GENERAL;  Code Status: Full Code    PT/OT Eval Status: ordered    Dispo - inpatient.  DC hopefully tomorrow after tunneled central line placement    Cindy Champagne MD  2:26 PM 4/18/2019

## 2019-04-18 NOTE — PROGRESS NOTES
CMS 0-100% Score: 50.11  ADL Inpatient CMS G-Code Modifier : CK    Goals  Short term goals  Time Frame for Short term goals: by D/C  Short term goal 1: Transfer to/from commode/chair with CGA - Not met  Short term goal 2:  Increase standing tolerance to 5 min with CGA for self care - Not met    Patient Goals   Patient goals : to get back to walking       Therapy Time   Individual Concurrent Group Co-treatment   Time In 1330         Time Out 1400         Minutes 30           Timed Code Treatment Minutes:   30    Total Treatment Minutes:  GEORGE Abbott

## 2019-04-18 NOTE — CARE COORDINATION
[facility] 2266 to update them on patient and that he will be on IV ABX until 4/24. They do not have a problem with this but pt does not have a PICC line.      Electronically signed by Olivia Moreno RN on 4/18/2019 at 11:32 AM  [phone]

## 2019-04-18 NOTE — PROGRESS NOTES
none  Poor appetite-No  Confusion- no  Urinary complaints- no  Any other complaints- no  All other ROS are reviewed and are Negative    Vitals :     Vitals:    04/18/19 0749   BP: 127/73   Pulse: 96   Resp: 16   Temp: 99 °F (37.2 °C)   SpO2: 96%       I & O :       Intake/Output Summary (Last 24 hours) at 4/18/2019 1049  Last data filed at 4/18/2019 0935  Gross per 24 hour   Intake 720 ml   Output 3875 ml   Net -3155 ml        Physical Examination :     General appearance: Anxious- yes, distressed- no, in good spirits- no  Verbal, sitting up on chair  HEENT: Lips- normal, teeth- ok , oral mucosa- moist  Neck : Mass- no, appears symmetrical, JVD- not visible  Respiratory: Respiratory effort-  Normal on oxygen , wheeze- no, crackles - no  Cardiovascular: Ausculation- No M/R/G, Edema yes trace  Abdomen: visible mass- no, distention- no, scar- no, tenderness- no                            hepatosplenomegaly-  no  Musculoskeletal:  clubbing no,cyanosis- no , digital ischemia- no                           muscle strength- grossly normal , tone - grossly normal  Skin: rashes- no , ulcers- no, induration- no, tightening - no  Psychiatric:  Judgement and insight- normal           AAO X 3     LABS:     No results for input(s): WBC, HGB, HCT, PLT in the last 72 hours.   Recent Labs     04/16/19  0534 04/17/19  0542 04/18/19  0553   * 134* 136   K 3.2* 3.2* 3.0*    100 101   CO2 20* 19* 21   BUN 27* 25* 22*   CREATININE 3.5* 3.4* 3.0*   GLUCOSE 99 108* 98

## 2019-04-18 NOTE — CARE COORDINATION
Pt is scheduled to go to IR for PICC placement tomorrow and then may be able to discharge back to [facility]. Arranged for transport for tomorrow at 1600 via [id number].      Electronically signed by Christine Ga RN on 4/18/2019 at 3:44 PM  [phone]

## 2019-04-19 ENCOUNTER — APPOINTMENT (OUTPATIENT)
Dept: INTERVENTIONAL RADIOLOGY/VASCULAR | Age: 64
DRG: 469 | End: 2019-04-19
Payer: MEDICAID

## 2019-04-19 LAB
ANION GAP SERPL CALCULATED.3IONS-SCNC: 11 MMOL/L (ref 3–16)
BUN BLDV-MCNC: 20 MG/DL (ref 7–20)
CALCIUM SERPL-MCNC: 8.3 MG/DL (ref 8.3–10.6)
CHLORIDE BLD-SCNC: 102 MMOL/L (ref 99–110)
CO2: 22 MMOL/L (ref 21–32)
CREAT SERPL-MCNC: 2.8 MG/DL (ref 0.8–1.3)
GFR AFRICAN AMERICAN: 28
GFR NON-AFRICAN AMERICAN: 23
GLUCOSE BLD-MCNC: 93 MG/DL (ref 70–99)
HCT VFR BLD CALC: 24.2 % (ref 40.5–52.5)
HEMOGLOBIN: 7.9 G/DL (ref 13.5–17.5)
INR BLD: 1.45 (ref 0.86–1.14)
MAGNESIUM: 2 MG/DL (ref 1.8–2.4)
MCH RBC QN AUTO: 28.3 PG (ref 26–34)
MCHC RBC AUTO-ENTMCNC: 32.5 G/DL (ref 31–36)
MCV RBC AUTO: 87.2 FL (ref 80–100)
PDW BLD-RTO: 14.5 % (ref 12.4–15.4)
PLATELET # BLD: 459 K/UL (ref 135–450)
PMV BLD AUTO: 7.2 FL (ref 5–10.5)
POTASSIUM SERPL-SCNC: 3.2 MMOL/L (ref 3.5–5.1)
PROTHROMBIN TIME: 16.5 SEC (ref 9.8–13)
RBC # BLD: 2.77 M/UL (ref 4.2–5.9)
SODIUM BLD-SCNC: 135 MMOL/L (ref 136–145)
WBC # BLD: 13.2 K/UL (ref 4–11)

## 2019-04-19 PROCEDURE — 97530 THERAPEUTIC ACTIVITIES: CPT

## 2019-04-19 PROCEDURE — 2500000003 HC RX 250 WO HCPCS

## 2019-04-19 PROCEDURE — 97110 THERAPEUTIC EXERCISES: CPT

## 2019-04-19 PROCEDURE — 6370000000 HC RX 637 (ALT 250 FOR IP): Performed by: INTERNAL MEDICINE

## 2019-04-19 PROCEDURE — 2580000003 HC RX 258: Performed by: INTERNAL MEDICINE

## 2019-04-19 PROCEDURE — 1200000000 HC SEMI PRIVATE

## 2019-04-19 PROCEDURE — 36558 INSERT TUNNELED CV CATH: CPT | Performed by: RADIOLOGY

## 2019-04-19 PROCEDURE — 51798 US URINE CAPACITY MEASURE: CPT

## 2019-04-19 PROCEDURE — C1894 INTRO/SHEATH, NON-LASER: HCPCS

## 2019-04-19 PROCEDURE — 83735 ASSAY OF MAGNESIUM: CPT

## 2019-04-19 PROCEDURE — 85027 COMPLETE CBC AUTOMATED: CPT

## 2019-04-19 PROCEDURE — 6360000002 HC RX W HCPCS

## 2019-04-19 PROCEDURE — 6370000000 HC RX 637 (ALT 250 FOR IP): Performed by: FAMILY MEDICINE

## 2019-04-19 PROCEDURE — 80048 BASIC METABOLIC PNL TOTAL CA: CPT

## 2019-04-19 PROCEDURE — 77001 FLUOROGUIDE FOR VEIN DEVICE: CPT | Performed by: RADIOLOGY

## 2019-04-19 PROCEDURE — 2580000003 HC RX 258: Performed by: ORTHOPAEDIC SURGERY

## 2019-04-19 PROCEDURE — 02HV33Z INSERTION OF INFUSION DEVICE INTO SUPERIOR VENA CAVA, PERCUTANEOUS APPROACH: ICD-10-PCS | Performed by: RADIOLOGY

## 2019-04-19 PROCEDURE — 85610 PROTHROMBIN TIME: CPT

## 2019-04-19 PROCEDURE — 99232 SBSQ HOSP IP/OBS MODERATE 35: CPT | Performed by: INTERNAL MEDICINE

## 2019-04-19 PROCEDURE — 6360000002 HC RX W HCPCS: Performed by: INTERNAL MEDICINE

## 2019-04-19 PROCEDURE — 36415 COLL VENOUS BLD VENIPUNCTURE: CPT

## 2019-04-19 PROCEDURE — 6370000000 HC RX 637 (ALT 250 FOR IP): Performed by: ORTHOPAEDIC SURGERY

## 2019-04-19 RX ORDER — POTASSIUM CHLORIDE 20 MEQ/1
40 TABLET, EXTENDED RELEASE ORAL 2 TIMES DAILY
Status: COMPLETED | OUTPATIENT
Start: 2019-04-19 | End: 2019-04-20

## 2019-04-19 RX ADMIN — ARIPIPRAZOLE 5 MG: 5 TABLET ORAL at 20:35

## 2019-04-19 RX ADMIN — ARIPIPRAZOLE 5 MG: 5 TABLET ORAL at 12:31

## 2019-04-19 RX ADMIN — POTASSIUM CHLORIDE 40 MEQ: 20 TABLET, EXTENDED RELEASE ORAL at 20:35

## 2019-04-19 RX ADMIN — SODIUM CHLORIDE, SODIUM GLUCONATE, SODIUM ACETATE, POTASSIUM CHLORIDE AND MAGNESIUM CHLORIDE 1000 ML: 526; 502; 368; 37; 30 INJECTION, SOLUTION INTRAVENOUS at 20:35

## 2019-04-19 RX ADMIN — TAMSULOSIN HYDROCHLORIDE 0.8 MG: 0.4 CAPSULE ORAL at 20:35

## 2019-04-19 RX ADMIN — HYDROCODONE BITARTRATE AND ACETAMINOPHEN 1 TABLET: 10; 325 TABLET ORAL at 12:31

## 2019-04-19 RX ADMIN — MEROPENEM 1 G: 1 INJECTION, POWDER, FOR SOLUTION INTRAVENOUS at 01:18

## 2019-04-19 RX ADMIN — Medication 5 MG: at 20:35

## 2019-04-19 RX ADMIN — Medication 325 MG: at 12:30

## 2019-04-19 RX ADMIN — ATORVASTATIN CALCIUM 10 MG: 10 TABLET, FILM COATED ORAL at 20:35

## 2019-04-19 RX ADMIN — POTASSIUM CHLORIDE 40 MEQ: 20 TABLET, EXTENDED RELEASE ORAL at 12:31

## 2019-04-19 RX ADMIN — Medication 10 ML: at 12:32

## 2019-04-19 RX ADMIN — MEROPENEM 1 G: 1 INJECTION, POWDER, FOR SOLUTION INTRAVENOUS at 12:32

## 2019-04-19 RX ADMIN — THERA TABS 1 TABLET: TAB at 12:31

## 2019-04-19 RX ADMIN — Medication 10 ML: at 20:35

## 2019-04-19 ASSESSMENT — PAIN DESCRIPTION - ORIENTATION: ORIENTATION: LEFT

## 2019-04-19 ASSESSMENT — PAIN SCALES - PAIN ASSESSMENT IN ADVANCED DEMENTIA (PAINAD)
FACIALEXPRESSION: 0
NEGVOCALIZATION: 0
NEGVOCALIZATION: 0
CONSOLABILITY: 0
BODYLANGUAGE: 0
FACIALEXPRESSION: 0
FACIALEXPRESSION: 0
NEGVOCALIZATION: 0
BREATHING: 0
BREATHING: 0
TOTALSCORE: 0
TOTALSCORE: 0
CONSOLABILITY: 0
BREATHING: 0
BODYLANGUAGE: 0
CONSOLABILITY: 0
FACIALEXPRESSION: 0
TOTALSCORE: 0
TOTALSCORE: 0
NEGVOCALIZATION: 0
BODYLANGUAGE: 0
BREATHING: 0
CONSOLABILITY: 0
BODYLANGUAGE: 0

## 2019-04-19 ASSESSMENT — PAIN SCALES - GENERAL
PAINLEVEL_OUTOF10: 0
PAINLEVEL_OUTOF10: 5
PAINLEVEL_OUTOF10: 2
PAINLEVEL_OUTOF10: 0
PAINLEVEL_OUTOF10: 0

## 2019-04-19 ASSESSMENT — PAIN DESCRIPTION - ONSET: ONSET: GRADUAL

## 2019-04-19 ASSESSMENT — PAIN DESCRIPTION - FREQUENCY: FREQUENCY: CONTINUOUS

## 2019-04-19 ASSESSMENT — PAIN DESCRIPTION - PAIN TYPE: TYPE: SURGICAL PAIN

## 2019-04-19 ASSESSMENT — PAIN DESCRIPTION - PROGRESSION: CLINICAL_PROGRESSION: NOT CHANGED

## 2019-04-19 ASSESSMENT — PAIN DESCRIPTION - LOCATION: LOCATION: HIP

## 2019-04-19 ASSESSMENT — PAIN DESCRIPTION - DESCRIPTORS: DESCRIPTORS: ACHING

## 2019-04-19 NOTE — CARE COORDINATION
Potential discharge expected 04/20 and transport arranged for 1200 tomorrow. [facility] to update them as well. Ambulance form on chart.      Electronically signed by Yvan Antonio RN on 4/19/2019 at 3:57 PM  [phone]

## 2019-04-19 NOTE — PROGRESS NOTES
Occupational Therapy    Pt off the floor for procedure - unavailable for OT treatment. Will continue per POC.      Paulette Oseguera, OTR/L, 1996

## 2019-04-19 NOTE — PROGRESS NOTES
Patient is a/o x 3 to person, place, and time, disoriented to situation. Patient denies c/o nausea/pain. VSS. Non-skid socks on, SCD'S remain in place. Patient resting comfortably overnight w/minimal requests/complaints. Fall precautions in place. Bed locked and in lowest position, bedside table and nurse call light within reach. Instructed patient to call out for assistance. Patient remains free from falls at this time, will continue to monitor.

## 2019-04-19 NOTE — H&P
Patient:  Bri Espinoza   :   1955      Relevant patient history reviewed and discussed. The procedure including risks and benefits was discussed at length with the patient (or designated family member) and all questions were answered. Informed consent to proceed with the procedure was given. Condition : stable    [facility] nurses notes reviewed and agreed. Medications reviewed.   Allergies: No Known Allergies

## 2019-04-19 NOTE — PROGRESS NOTES
[facility]    [facility]              [phone]                     Plan :     Cortisol normal  Cr getting better  OK to DC from renal perspective if tachycardia gets better today  Potassium low due to polyuria also  40 po bid kcl ordered  HR is still 100, continue fluids for now  Add on magnesium to today's labs  Continue fluids 100 ml/hr, can go up if BP goes down    Assessment :     Acute Kidney Injury  Cr peaked to 4.2 - now 2.8  Likely ATN from sepsis  Surgery   Hemodynamic changes    UA- large blood, LE large, high wbc has quigley( getting abx)  Urinary retention- urology following    Acidosis  Off of bicarb drip  CO2 is ok     CKD Stage III  Followed by Dr Sergio Sommer in office  H/o Bilateral hydronephrosis history  Has indwelling Quigley catheter  Cr 2 in 1/2015    Echo 2017- EF of 04%, normal diastolic function  Mild TR with RVSP 31 mmHg    Hypertension  BP: (109-127)/(63-84)  Pulse:  []   Still on low side and tachycardia  Continue fluids    Anemia of CKD  Hgb- 7.9  Likely due to fracture, blood loss    Renal Osteodystrophy  PO4- 4  PTH not reliable in presence of LIANA, since LIANA increases PTH, and     Will come down with improvement    [facility] would like to thank Nba Farah MD   for opportunity to serve this patient      Please call with questions at-   24 Hrs Answering service [phone] or  7 am- 5 pm via Perfect [employer] or cell phone  Dr.Sudhir Coleen Gomez          CC/reason for consult :     LIANA     HPI :     From consult note-     Marcellus Prince is a 75 y.o. male presented to   the hospital on 4/13/2019 with left hip pain. He is a NH resident, was walking around  When he nearly passed out, and hit hip. Pain constant, getting worse for few days,  Associated with difficulty walking. He normally  Uses walker to mobilize. He is found to have hip fracture and will  Need  Operative repair. In the meantime, he is noted to have LIANA on   CKD for which we are consultec. Interval History:     Made 2.1 L urine yesterday    ROS:     Seen with- no family  SOB- none  Edema- none  Nausea/vomiting- none  Poor appetite-No  Confusion- no  Urinary complaints- no  Any other complaints- no  All other ROS are reviewed and are Negative    Vitals :     Vitals:    04/19/19 0936   BP: 127/84   Pulse: 120   Resp: 14   Temp: 98.4 °F (36.9 °C)   SpO2: 95%       I & O :       Intake/Output Summary (Last 24 hours) at 4/19/2019 0957  Last data filed at 4/19/2019 8472  Gross per 24 hour   Intake 1204.47 ml   Output 1445 ml   Net -240.53 ml        Physical Examination :     General appearance: Anxious- yes, distressed- no, in good spirits- no  Verbal, sitting up on chair  HEENT: Lips- normal, teeth- ok , oral mucosa- moist  Neck : Mass- no, appears symmetrical, JVD- not visible  Respiratory: Respiratory effort-  Normal on oxygen , wheeze- no, crackles - no  Cardiovascular:  Ausculation- No M/R/G, Edema yes trace  Abdomen: visible mass- no, distention- no, scar- no, tenderness- no                            hepatosplenomegaly-  no  Musculoskeletal:  clubbing no,cyanosis- no , digital ischemia- no                           muscle strength- grossly normal , tone - grossly normal  Skin: rashes- no , ulcers- no, induration- no, tightening - no  Psychiatric:  Judgement and insight- normal           AAO X 3     LABS:     Recent Labs     04/19/19  0433   WBC 13.2*   HGB 7.9*   HCT 24.2*   *     Recent Labs     04/17/19  0542 04/18/19  0553 04/19/19  0432   * 136 135*   K 3.2* 3.0* 3.2*    101 102   CO2 19* 21 22   BUN 25* 22* 20   CREATININE 3.4* 3.0* 2.8*   GLUCOSE 108* 98 93

## 2019-04-19 NOTE — PROGRESS NOTES
Hospitalist Progress Note    PCP: Kelle Hutchinson MD    Date of Admission: 4/13/2019  Hospital Day: 6      Chief Complaint:   Chief Complaint   Patient presents with    Hip Pain           Hospital Course:    Admitted after a near syncopal episode and fall with left hip fracture and found to have urinary tract infection as well as urinary retention    Subjective:   Patient seen and examined  No overnight events, no new complaints  Good UOP after quigley removal  Continues to be tachycardic      Ancillary notes reviewed    Medications:  Reviewed    Infusion Medications    electrolyte-A 1,000 mL (04/18/19 2147)     Scheduled Medications    potassium chloride  40 mEq Oral BID    meropenem  1 g Intravenous Q12H    sodium chloride flush  10 mL Intravenous 2 times per day    docusate sodium  100 mg Oral BID    [Held by provider] apixaban  2.5 mg Oral BID    atorvastatin  10 mg Oral Nightly    ARIPiprazole  5 mg Oral BID    ferrous sulfate  325 mg Oral Daily with breakfast    melatonin  5 mg Oral Nightly    multivitamin  1 tablet Oral Daily    sennosides-docusate sodium  1 tablet Oral BID    tamsulosin  0.8 mg Oral Nightly     PRN Meds: sodium chloride flush, morphine **OR** morphine, HYDROcodone-acetaminophen **OR** HYDROcodone-acetaminophen, acetaminophen, ondansetron, polyethylene glycol, acetaminophen      Intake/Output Summary (Last 24 hours) at 4/19/2019 1355  Last data filed at 4/19/2019 0626  Gross per 24 hour   Intake 1084.47 ml   Output 995 ml   Net 89.47 ml       Exam:    /80   Pulse 104   Temp 98.1 °F (36.7 °C) (Oral)   Resp 16   Ht 5' 10\" (1.778 m)   Wt 220 lb 4.8 oz (99.9 kg)   SpO2 97%   BMI 31.61 kg/m²       General appearance: No apparent distress, appears stated age and cooperative. HEENT: Pupils equal, round, and reactive to light. Conjunctivae/corneas clear. Neck: Supple, with full range of motion. No jugular venous distention. Trachea midline.   Respiratory:  Normal respiratory effort. Clear to auscultation, bilaterally without Rales/Wheezes/Rhonchi. Cardiovascular: Regular rate and rhythm with normal S1/S2 without murmurs, rubs or gallops. Abdomen: Soft, non-tender, non-distended with normal bowel sounds. Musculoskeletal: No clubbing, cyanosis or edema bilaterally. Full range of motion without deformity. Skin: Skin color, texture, turgor normal.  No rashes or lesions. Neurologic:  Neurovascularly intact without any focal sensory/motor deficits. Cranial nerves: II-XII intact, grossly non-focal.  Psychiatric: Alert and oriented, thought content appropriate, normal insight  Capillary Refill: Brisk,< 3 seconds   Peripheral Pulses: +2 palpable, equal bilaterally       Labs:   Recent Labs     04/19/19  0433   WBC 13.2*   HGB 7.9*   HCT 24.2*   *     Recent Labs     04/17/19  0542 04/18/19  0553 04/19/19  0432   * 136 135*   K 3.2* 3.0* 3.2*    101 102   CO2 19* 21 22   BUN 25* 22* 20   CREATININE 3.4* 3.0* 2.8*   CALCIUM 8.5 8.2* 8.3     No results for input(s): AST, ALT, BILIDIR, BILITOT, ALKPHOS in the last 72 hours. Recent Labs     04/19/19  0433   INR 1.45*     No results for input(s): Dareen Serge in the last 72 hours. Urinalysis:      Lab Results   Component Value Date    NITRU Negative 04/14/2019    WBCUA  04/14/2019    BACTERIA 1+ 04/14/2019    RBCUA 20-50 04/14/2019    BLOODU LARGE 04/14/2019    SPECGRAV 1.010 04/14/2019    GLUCOSEU Negative 04/14/2019       Radiology:  XR PELVIS (1-2 VIEWS)   Final Result      Postsurgical changes from left total hip arthroplasty. No malalignment or fracture. XR HIP LEFT (1 VIEW)   Final Result      Intraoperative fluoroscopy of the left hip. Refer to the operative note for details. FLUORO FOR SURGICAL PROCEDURES   Final Result      Intraoperative fluoroscopy of the left hip. Refer to the operative note for details.       XR CHEST 1 VW   Final Result      No evidence for acute cardiopulmonary disease. XR HIP LEFT (2-3 VIEWS)   Final Result      1. Acute left femoral neck fracture              Assessment/Plan:    Active Hospital Problems    Diagnosis    UTI due to extended-spectrum beta lactamase (ESBL) producing Escherichia coli [N39.0, B96.29, Z16.12]    Hip fracture requiring operative repair, left, closed, initial encounter ([facility] [address] [U06.970W]    Complicated UTI (urinary tract infection) [N39.0]    LIANA (acute kidney injury) ([facility] [address] [N17.9]    Personal history of schizophrenia [Z86.59]    Pure hypercholesterolemia [E78.00]       S/p Left total hip arthroplasty on 4/14  Urology following for urinary retention. Lee removed today, voiding trial today  ESBL E coli UTI, on meropenem, ID consulted, appreciate recs  Sepsis ruled out with normal blood pressure and lactate. Acute kidney injury, nephrology following. Likely ATN secondary to infection. Watch closely. Cr improving  Continue IV fluids  Continue home medications for schizophrenia  PT, OT ordered  Transient tachycardia, suspect sec to pain vs infection. Improved. Sinus rhythm on EKG. Will avoid BB due to borderline BP  tunneled central line placement planned for later today. Will DC tomorrow    DVT Prophylaxis: Lovenox  Diet: Diet NPO Effective Now  Code Status: Full Code    PT/OT Eval Status: ordered    Dispo - inpatient.  DC hopefully tomorrow after tunneled central line placement    Juancarlos Schneider MD  1:55 PM 4/19/2019

## 2019-04-19 NOTE — PROGRESS NOTES
Physical Therapy  Facility/Department: [place] [address] 5T ORTHO/NEURO  Daily Treatment Note  NAME: Rm Luo  : 1955  MRN: [id number]    Date of Service: 2019    Discharge Recommendations:  Rm Luo scored a 17/24 on the AM-PAC short mobility form. Current research shows that an AM-PAC score of 17 or less is typically not associated with a discharge to the patient's home setting. Based on the patients AM-PAC score and their current functional mobility deficits, it is recommended that the patient have 3-5 sessions per week of Physical Therapy at d/c to increase the patients independence    Patient would benefit from continued therapy after discharge   PT Equipment Recommendations  Equipment Needed: No  Other: defer    Patient Diagnosis(es): The primary encounter diagnosis was Closed fracture of left hip, initial encounter ([facility] [address] A diagnosis of LIANA (acute kidney injury) ([facility] [address] was also pertinent to this visit. has a past medical history of Encounter for long-term (current) use of other medications, ESBL (extended spectrum beta-lactamase) producing bacteria infection, History of colonoscopy with polypectomy, Hypercholesterolemia, Neurodermatitis, Personal history of mental disorder, Personal history of schizophrenia, Pure hypercholesterolemia, Rosacea, Smoker, Special screening for malignant neoplasm of prostate, Tobacco abuse, and Tobacco use disorder. has a past surgical history that includes Foot fracture surgery; HEMIARTHROPLASTY HIP (Left, 2019); and Cystoscopy (2019). Restrictions  Position Activity Restriction  Other position/activity restrictions: full weight bearing as tolerated, anterior hip precautions  Subjective   General  Additional Pertinent Hx: Pt admitted on 19 with hip fracture. Pt underwent L hemiarthroplasty on 19. H/o colonoscopy, neurodermatitis, schizophrenia, rosacea, L THR 2019.   Referring Practitioner: Dr. Cassidy Berry  Subjective  Subjective: Pt supine willing to participate   Pain Screening  Patient Currently in Pain: No  Vital Signs  Patient Currently in Pain: No  Orientation  Orientation  Overall Orientation Status: Impaired  Objective   Bed mobility  Supine to Sit: Stand by assistance  Scooting: Stand by assistance  Transfers  Sit to Stand: Contact guard assistance  Stand to sit: Contact guard assistance  Ambulation 1  Surface: level tile  Device: Rolling Walker  Assistance: Contact guard assistance;Stand by assistance  Quality of Gait: slow blair, step-to pattern progressing to step-through at times, decreased stance time & WB left LE. Distance: x12ft; x20ft  Balance  Sitting - Static: Good  Sitting - Dynamic: Good  Standing - Static: Fair  Standing - Dynamic: Fair  Exercises  Gluteal Sets: 15x5 sec B   Knee Long Arc Quad: x15 B   Ankle Pumps: x20 B   Comments: static standing with hip hinge to javier briefs without UE support    M/L weight shift with unilateral reaching into extension and across midline to perform pericare. Assessment   Assessment: Pt is progressing well with no c/o pain or weakness. Presented with fair trunk and LE control during bed mobility with the HOB slightly elevated and use of the handrails to get EOB. Transfers and gait training were steady and safe without sequencing cues. Required assistance cleaning up after incontinence in bed.        Treatment Diagnosis: Decreased functional mobility related to L hip fracture  Prognosis: Good  Patient Education: transfers, gait, LE ther ex  REQUIRES PT FOLLOW UP: Yes     AM-PAC Score  AM-PAC Inpatient Mobility Raw Score : 17  AM-PAC Inpatient T-Scale Score : 42.13  Mobility Inpatient CMS 0-100% Score: 50.57  Mobility Inpatient CMS G-Code Modifier : CK    Goals  Short term goals  Time Frame for Short term goals: by discharge  Short term goal 1: Bed mobility with min assist  ongoing  Short term goal 2: Sit to stand with min assist MET 4/17  New goal: Pt will transfer sit <--> stand with

## 2019-04-19 NOTE — PROGRESS NOTES
ID Follow-up NOTE    CC:   ESBL E coli complicated UTI  Antibiotics: Meropenem     Admit Date: 4/13/2019  Hospital Day: 7    Subjective:     Patient feel good - no urinary complaints      Objective:     Patient Vitals for the past 8 hrs:   BP Temp Temp src Pulse Resp SpO2 Weight   04/19/19 1221 125/80 98.1 °F (36.7 °C) Oral 104 16 97 % --   04/19/19 0936 127/84 98.4 °F (36.9 °C) Oral 120 14 95 % --   04/19/19 0530 -- -- -- -- -- -- 220 lb 4.8 oz (99.9 kg)     I/O last 3 completed shifts: In: 1444.5 [P.O.:600; I.V.:844.5]  Out: 2195 [Urine:2195]  No intake/output data recorded. EXAM:  GENERAL: No apparent distress.     HEENT: Membranes moist, no oral lesion  NECK:  Supple  LUNGS: Clear b/l, no rales, no dullness  CARDIAC: RRR, no murmur appreciated  ABD:               + BS, soft / NT - no SP tenderness, no CVAT  EXT:  No rash, no edema, no lesions  NEURO: No focal neurologic findings  PSYCH: Orientation, sensorium, mood normal  LINES:  Peripheral iv       Data Review:  Lab Results   Component Value Date    WBC 13.2 (H) 04/19/2019    HGB 7.9 (L) 04/19/2019    HCT 24.2 (L) 04/19/2019    MCV 87.2 04/19/2019     (H) 04/19/2019     Lab Results   Component Value Date    CREATININE 2.8 (H) 04/19/2019    BUN 20 04/19/2019     (L) 04/19/2019    K 3.2 (L) 04/19/2019     04/19/2019    CO2 22 04/19/2019       Hepatic Function Panel:   Lab Results   Component Value Date    ALKPHOS 199 06/05/2017    ALT 39 06/05/2017    AST 30 06/05/2017    PROT 6.9 06/05/2017    BILITOT 1.5 06/05/2017    BILIDIR 0.6 06/05/2017    IBILI 0.9 06/05/2017    LABALBU 3.0 06/06/2017       Micro:  4/14 Urine cult - >100k E coli  Escherichia coli (esbl)           Antibiotic Interpretation PAM Status     amoxicillin-clavulanate Intermediate 16/8 mcg/mL       ampicillin Resistant >16 mcg/mL       ceFAZolin Resistant >16 mcg/mL       cefepime Resistant >16 mcg/mL       cefTRIAXone Resistant >32 mcg/mL       ciprofloxacin Resistant >2 mcg/mL       gentamicin Resistant >8 mcg/mL       levofloxacin Resistant >4 mcg/mL       meropenem Sensitive <=0.25 mcg/mL       nitrofurantoin Sensitive <=16 mcg/mL       tetracycline Resistant >8 mcg/mL       tobramycin Resistant >8 mcg/mL       trimethoprim-sulfamethoxazole Sensitive <=0.5/9.5 mcg/mL           BC x 2 no growth to date     Imagin/13 L hip:   There is acute left femoral neck fracture with mild displacement and angulation. Pelvic ring is intact. Mild degenerative spondylosis of visualized lumbar spine. Scheduled Meds:   potassium chloride  40 mEq Oral BID    meropenem  1 g Intravenous Q12H    sodium chloride flush  10 mL Intravenous 2 times per day    docusate sodium  100 mg Oral BID    [Held by provider] apixaban  2.5 mg Oral BID    atorvastatin  10 mg Oral Nightly    ARIPiprazole  5 mg Oral BID    ferrous sulfate  325 mg Oral Daily with breakfast    melatonin  5 mg Oral Nightly    multivitamin  1 tablet Oral Daily    sennosides-docusate sodium  1 tablet Oral BID    tamsulosin  0.8 mg Oral Nightly       Continuous Infusions:   electrolyte-A 1,000 mL (19)       PRN Meds:  sodium chloride flush, morphine **OR** morphine, HYDROcodone-acetaminophen **OR** HYDROcodone-acetaminophen, acetaminophen, ondansetron, polyethylene glycol, acetaminophen      Assessment:     76 yo man with hx mental d/o. Lives at a nursing facility.     Presents with fall / syncopy vs near syncopal episode. C/o L hip pain.     In ED / admit  - afebrile, low BP, WBC 13, Cr 3.8.  UA mod LE, micro >100 WBC / HPF  X-ray + L hip femoral neck fx.        involved to place quigley and urethral dilatation -   Ortho ORIF / L MARIBEL -      Urine cult + ESBL Ecoli  Started on Meropenem on      Pt reports NO difficulty urinating with reduced stream, no dysuria, no change in urine (odor / smell)    IMP/  Med co-morbidities - psych, CKD  Fall, hip fx, s/p MARIBEL   LIANA / CKD - likely chronic obstructive nephropathy     UTI vs bacteriuria - E coli, ESBL, on meropenem, d#4. 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   I feel pt should have course of treatment     Plan:     Cont meropenem, appreciate pharmacy dose adjustment   Midline and treat for 7 - 10 day course   See KUNAL - can use ertapenem after discharge (once daily dosing)     Discussed with pt  Radu Arce MD      INFUSION ORDERS:  Invanz 500 mg iv daily through 4/24  - First dose given in hosp  - Check CBC w diff, CMP, ESR, CRP every Mon or [address] result to [phone]  - Call with any change in status, transfer out of facility or to hospital - call [phone]

## 2019-04-19 NOTE — DISCHARGE INSTR - COC
Continuity of Care Form    Patient Name: Genaro Gutierres   :  1955  MRN:  [id number]    Admit date:  2019  Discharge date:  2019    Code Status Order: Full Code   Advance Directives:   Advance Care Flowsheet Documentation     Date/Time Healthcare Directive Type of Healthcare Directive Copy in [address] Agent's Name Healthcare Agent's Phone Number    [phone]  Other (Comment) Pt does not know -- -- -- -- --          Admitting Physician:  Venita Reece MD  PCP: Venkat Mojica MD    Discharging Nurse: [facility] Unit/Room#: 0810/1093-10  Discharging Unit Phone Number: ***    Emergency Contact:   Extended Emergency Contact Information  Primary Emergency Contact: [facility] Phone: [phone]  Relation: None  Secondary Emergency Contact: [facility] Phone: [phone]  Mobile Phone: [phone]  Relation: Brother/Sister    Past Surgical History:  Past Surgical History:   Procedure Laterality Date    CYSTOSCOPY  2019    CYSTOSCOPY, URETHRAL DILATION performed by Imelda Downs MD at [address] [facility]      repair    HEMIARTHROPLASTY HIP Left 2019    LEFT TOTAL HIP ARTHROPLASTY performed by Mindy Lopez MD at [address]       Immunization History:   Immunization History   Administered Date(s) Administered    DTP 2001, 2007, 2007, 2007, 2007    DTaP 2007    Pneumococcal Conjugate 7-valent 2007, 2007, 2007, 2007, 2007, 2007, 2007, 2008    Tdap (Boostrix, Adacel) 2007, 2007       Active Problems:  Patient Active Problem List   Diagnosis Code    Pure hypercholesterolemia E78.00    Tobacco use disorder F17.200    Personal history of schizophrenia Z86.59    Rosacea L71.9    Pneumonia J18.9    Hip fracture requiring operative repair, left, closed, [facility] L25.432S    Complicated UTI diagnosis listed and that he requires Intermediate Nursing Care for greater 30 days.      Update Admission H&P: No change in H&P    PHYSICIAN SIGNATURE:  Shanon Wells MD

## 2019-04-19 NOTE — PROGRESS NOTES
Nutrition Assessment     Type and Reason for Visit: Initial    Nutrition Recommendations: Continue current diet     Nutrition Assessment:  Nutrition LOS eval. Pt nutritionally stable, per RN who reports eating greater maco 75% of meals, s/p L ORIF. Off unit at this time.  RD will continue to monitor for continued adequate po intake through admission    Malnutrition Assessment:  · Malnutrition Status: No malnutrition    Nutrition Risk Level   Risk Level: Low    Nutrition Diagnosis:   · Problem: No nutrition diagnosis at this time    Nutrition Intervention:  Food and/or Delivery: Continue current diet  Nutrition Education/Counseling/Coordination of Care:  Continued Inpatient Monitoring      Electronically signed by Daniel Bruner RD, LD on 4/19/19 at 3:09 PM    Contact Number: [phone]

## 2019-04-19 NOTE — PLAN OF CARE
Problem: Falls - Risk of:  Goal: Will remain free from falls  Description  Patient is a high fall risk, fall risk precautions enforced. Patient in bed with alarm and nonskid socks on, 3/4 side rails up and bed locked in lowest position. Call light, belongings, and bedside table within reach. Patient educated on using call light and instructed to call out for assistance when getting out of bed, patient verbalized understanding. Patient calls out approprietly and remains free of falls. Outcome: Ongoing  Note:   Hourly rounding on patient for needs. Non-skid socks on, bed in lowest position and locked. Bedside table, personal belongs, and nurse call light within reach. Instructed patient to use call light for assistance. Bed alarm on. . Floor clear of clutter. Patient remains free of falls at this time. Will continue to monitor. Problem: Infection - Surgical Site:  Goal: Signs of wound healing will improve  Description  Signs of wound healing will improve  Outcome: Ongoing  Note:   JUAN MANUEL surgical site d/t dressing, dressing is clean/dry/intact. No odor noted, will continue to monitor. Problem: Pain - Acute:  Goal: Pain level will decrease  Description  Pain level will decrease  Outcome: Ongoing  Note:   Per advanced dementia scale, patient not exhibiting any s/s of pain. Will continue to monitor.

## 2019-04-19 NOTE — PROCEDURES
IR Brief Postoperative Note    Kimberly Waldrop  YOB: 1955  [id number]    Pre-operative Diagnosis: need for central venous antibiotics    Post-operative Diagnosis: Same    Procedure: right IJ tunneled PICC    Anesthesia: local    Surgeons/Assistants: jad    Estimated Blood Loss: Minimal    Complications: none    Specimens: were not obtained    See full procedure dictation to follow      Erik Poole MD  4/19/2019

## 2019-04-19 NOTE — PROGRESS NOTES
Urology Attending Progress Note      Subjective: feeling better. No complaints     Vitals:  /84   Pulse 120   Temp 98.4 °F (36.9 °C) (Oral)   Resp 14   Ht 5' 10\" (1.778 m)   Wt 220 lb 4.8 oz (99.9 kg)   SpO2 95%   BMI 31.61 kg/m²   Temp  Av.5 °F (36.9 °C)  Min: 97.7 °F (36.5 °C)  Max: 98.9 °F (37.2 °C)    Intake/Output Summary (Last 24 hours) at 2019 1047  Last data filed at 2019 0626  Gross per 24 hour   Intake 1204.47 ml   Output 1445 ml   Net -240.53 ml       Exam: abd soft and non-tender. Quigley out. Voiding clear urine . Labs:  WBC:    Lab Results   Component Value Date    WBC 13.2 2019     Hemoglobin/Hematocrit:    Lab Results   Component Value Date    HGB 7.9 2019    HCT 24.2 2019     BMP:    Lab Results   Component Value Date     2019    K 3.2 2019    K 3.9 2019     2019    CO2 22 2019    BUN 20 2019    LABALBU 3.0 2017    CREATININE 2.8 2019    CALCIUM 8.3 2019    GFRAA 28 2019    LABGLOM 23 2019     PT/INR:    Lab Results   Component Value Date    PROTIME 16.5 2019    INR 1.45 2019     PTT:    Lab Results   Component Value Date    APTT 28.2 2019   [APTT      Urine Culture:  ESBL E.coli 50 K    Blood Culture:  NGTD    Antibiotic Therapy:  Merrem     Imaging: no new       Impression/Plan: 74 yo M s/p Lt hip replacement and POD#5 s/p cysto, U-dil, and quigley placement   -Cr @ 2.8 from  3.0   -continue abx  -continue flomax  -voiding clear urine. PVRs acceptable    -bladder scan Q shift.  Okay to in and out for PVR > 450cc        Micheal Zamora, APRN - CNP

## 2019-04-20 VITALS
HEART RATE: 106 BPM | TEMPERATURE: 97.6 F | WEIGHT: 219.4 LBS | SYSTOLIC BLOOD PRESSURE: 134 MMHG | HEIGHT: 70 IN | RESPIRATION RATE: 16 BRPM | DIASTOLIC BLOOD PRESSURE: 89 MMHG | BODY MASS INDEX: 31.41 KG/M2 | OXYGEN SATURATION: 96 %

## 2019-04-20 LAB
ANION GAP SERPL CALCULATED.3IONS-SCNC: 13 MMOL/L (ref 3–16)
BUN BLDV-MCNC: 20 MG/DL (ref 7–20)
CALCIUM SERPL-MCNC: 8.4 MG/DL (ref 8.3–10.6)
CHLORIDE BLD-SCNC: 101 MMOL/L (ref 99–110)
CO2: 23 MMOL/L (ref 21–32)
CREAT SERPL-MCNC: 2.6 MG/DL (ref 0.8–1.3)
GFR AFRICAN AMERICAN: 30
GFR NON-AFRICAN AMERICAN: 25
GLUCOSE BLD-MCNC: 93 MG/DL (ref 70–99)
POTASSIUM SERPL-SCNC: 3.8 MMOL/L (ref 3.5–5.1)
SODIUM BLD-SCNC: 137 MMOL/L (ref 136–145)

## 2019-04-20 PROCEDURE — 6360000002 HC RX W HCPCS: Performed by: INTERNAL MEDICINE

## 2019-04-20 PROCEDURE — 6370000000 HC RX 637 (ALT 250 FOR IP): Performed by: ORTHOPAEDIC SURGERY

## 2019-04-20 PROCEDURE — 2580000003 HC RX 258: Performed by: INTERNAL MEDICINE

## 2019-04-20 PROCEDURE — 80048 BASIC METABOLIC PNL TOTAL CA: CPT

## 2019-04-20 PROCEDURE — 2580000003 HC RX 258: Performed by: ORTHOPAEDIC SURGERY

## 2019-04-20 PROCEDURE — 6370000000 HC RX 637 (ALT 250 FOR IP): Performed by: FAMILY MEDICINE

## 2019-04-20 PROCEDURE — 6370000000 HC RX 637 (ALT 250 FOR IP): Performed by: INTERNAL MEDICINE

## 2019-04-20 RX ADMIN — SODIUM CHLORIDE, SODIUM GLUCONATE, SODIUM ACETATE, POTASSIUM CHLORIDE AND MAGNESIUM CHLORIDE 1000 ML: 526; 502; 368; 37; 30 INJECTION, SOLUTION INTRAVENOUS at 06:50

## 2019-04-20 RX ADMIN — THERA TABS 1 TABLET: TAB at 09:54

## 2019-04-20 RX ADMIN — DOCUSATE SODIUM 100 MG: 100 CAPSULE, LIQUID FILLED ORAL at 09:54

## 2019-04-20 RX ADMIN — ARIPIPRAZOLE 5 MG: 5 TABLET ORAL at 09:54

## 2019-04-20 RX ADMIN — MEROPENEM 1 G: 1 INJECTION, POWDER, FOR SOLUTION INTRAVENOUS at 01:01

## 2019-04-20 RX ADMIN — Medication 325 MG: at 09:54

## 2019-04-20 RX ADMIN — Medication 10 ML: at 09:54

## 2019-04-20 RX ADMIN — SENNOSIDES, DOCUSATE SODIUM 1 TABLET: 50; 8.6 TABLET, FILM COATED ORAL at 09:54

## 2019-04-20 RX ADMIN — POTASSIUM CHLORIDE 40 MEQ: 20 TABLET, EXTENDED RELEASE ORAL at 09:54

## 2019-04-20 ASSESSMENT — PAIN SCALES - PAIN ASSESSMENT IN ADVANCED DEMENTIA (PAINAD)
NEGVOCALIZATION: 0
BREATHING: 0
BREATHING: 0
CONSOLABILITY: 0
BODYLANGUAGE: 0
NEGVOCALIZATION: 0
NEGVOCALIZATION: 0
TOTALSCORE: 0
FACIALEXPRESSION: 0
BREATHING: 0
BODYLANGUAGE: 0
BODYLANGUAGE: 0
TOTALSCORE: 0
CONSOLABILITY: 0
TOTALSCORE: 0
FACIALEXPRESSION: 0
FACIALEXPRESSION: 0
CONSOLABILITY: 0

## 2019-04-20 ASSESSMENT — PAIN SCALES - GENERAL: PAINLEVEL_OUTOF10: 0

## 2019-04-20 NOTE — PROGRESS NOTES
Patient is a/o x3 to self, place, time. . Patient denies c/o nausea/pain. VSS. Non-skid socks on, SCD'S remain in place. Fall precautions in place. Bed locked and in lowest position, bedside table and nurse call light within reach. Instructed patient to call out for assistance. Patient remains free from falls at this time, will continue to monitor.

## 2019-04-20 NOTE — DISCHARGE SUMMARY
Hospital Medicine Discharge Summary    Patient ID: Herman Rossi      Patient's PCP: Micheline Stark MD    Admit Date: 4/13/2019     Discharge Date: 4/20/2019    Admitting Physician: Marilyn Arenas MD     Discharge Physician: Marilyn Arenas MD     Discharge Diagnoses:    Principal Problem:    Hip fracture requiring operative repair, left, closed, initial encounter [facility])  Active Problems:    Pure hypercholesterolemia    Personal history of schizophrenia    Complicated UTI (urinary tract infection)    LIANA (acute kidney injury) ([address]    UTI due to extended-spectrum beta lactamase (ESBL) producing Escherichia coli  Resolved Problems:    * No resolved hospital problems. *        The patient was seen and examined on day of discharge and this discharge summary is in conjunction with any daily progress note from day of discharge. Hospital Course:     Herman Rossi is a 75 y.o. male who was admitted for hip fracture and a UTI. Deb Pa He underwent left hip arthroplasty on 4/14, and also was noted to have urinary retention for which he received urethral dilation on the same day. His urine grew ESBL E. coli and he was started on meropenem. He was also noted to have acute kidney injury on admission, which improved with IV fluids and placement of Lee. He had placement of tunneled central catheter for continued IV antibiotics and was discharged to his long-term care facility in stable condition with a plan for ertapenem 500 mg IV daily through 4/24          Exam:     /89   Pulse 106   Temp 97.6 °F (36.4 °C) (Oral)   Resp 16   Ht 5' 10\" (1.778 m)   Wt 219 lb 6.4 oz (99.5 kg)   SpO2 96%   BMI 31.48 kg/m²       General appearance:  No apparent distress, appears stated age and cooperative. HEENT:  Normal cephalic, atraumatic without obvious deformity. Pupils equal, round, and reactive to light. Extra ocular muscles intact. Conjunctivae/corneas clear. Neck: Supple, with full range of motion.  No jugular venous distention. Trachea midline. Respiratory:  Normal respiratory effort. Clear to auscultation, bilaterally without Rales/Wheezes/Rhonchi. Cardiovascular:  Regular rate and rhythm with normal S1/S2 without murmurs, rubs or gallops. Abdomen: Soft, non-tender, non-distended with normal bowel sounds. Musculoskeletal:  No clubbing, cyanosis or edema bilaterally. Full range of motion without deformity. Skin: Skin color, texture, turgor normal.  No rashes or lesions. Neurologic:  Neurovascularly intact without any focal sensory/motor deficits. Cranial nerves: II-XII intact, grossly non-focal.  Psychiatric:  Alert and oriented, thought content appropriate, normal insight  Capillary Refill: Brisk,< 3 seconds   Peripheral Pulses: +2 palpable, equal bilaterally       Labs: For convenience and continuity at follow-up the following most recent labs are provided:      CBC:    Lab Results   Component Value Date    WBC 13.2 04/19/2019    HGB 7.9 04/19/2019    HCT 24.2 04/19/2019     04/19/2019       Renal:    Lab Results   Component Value Date     04/20/2019    K 3.8 04/20/2019    K 3.9 04/14/2019     04/20/2019    CO2 23 04/20/2019    BUN 20 04/20/2019    CREATININE 2.6 04/20/2019    CALCIUM 8.4 04/20/2019    PHOS 4.0 06/06/2017         Significant Diagnostic Studies    Radiology:   IR TUNNELED CVC PLACE WO SQ PORT/PUMP > 5 YEARS   Final Result   IMPRESSION :      Successful placement of right internal jugular tunneled small bore central venous catheter standardly positioned at the cavo-atrial junction. Please note that this is a noncuffed catheter and therefore can be removed at the bedside without need for    dissection. Limited ultrasound demonstrates a patent internal jugular vein. Fluoroscopy time : 0.2 minutes   Number of exposures obtained : 1   No moderate sedation was administered. The procedure was performed under local anesthesia.       XR PELVIS (1-2 VIEWS)   Final Result Postsurgical changes from left total hip arthroplasty. No malalignment or fracture. XR HIP LEFT (1 VIEW)   Final Result      Intraoperative fluoroscopy of the left hip. Refer to the operative note for details. FLUORO FOR SURGICAL PROCEDURES   Final Result      Intraoperative fluoroscopy of the left hip. Refer to the operative note for details. XR CHEST 1 VW   Final Result      No evidence for acute cardiopulmonary disease. XR HIP LEFT (2-3 VIEWS)   Final Result      1.  Acute left femoral neck fracture             Consults:     IP CONSULT TO HOSPITALIST  IP CONSULT TO ORTHOPEDIC SURGERY  IP CONSULT TO NEPHROLOGY  IP CONSULT TO ORTHOPEDIC SURGERY  IP CONSULT TO ORTHOPEDIC SURGERY  IP CONSULT TO CASE MANAGEMENT  IP CONSULT TO DIETITIAN  IP CONSULT TO SOCIAL WORK  IP CONSULT TO NEPHROLOGY  IP CONSULT TO SOCIAL WORK  IP CONSULT TO INFECTIOUS DISEASES  IP CONSULT TO INTERVENTIONAL RADIOLOGY    Disposition:  Long-term care facility    Condition at Discharge: Stable    Discharge Instructions/Follow-up:      Greta Alcantara MD  [address]          Mame Sandoval MD  [address] [place] [address]  [phone]    In 4 days      INFUSION ORDERS:  Invanz 500 mg iv daily through 4/24  - First dose given in hosp  - Check CBC w diff, CMP, ESR, CRP every Mon or Tue - FAX result to [phone]  - Call with any change in status, transfer out of facility or to hospital - call [phone]     [facility] DISCHARGE ORDER SET  Total Hip Replacement (Anterior)     Dr. Tariq Houston M.D.  [phone]     1. ( x  )  Activity instructions for SNF/HOME after Discharge:  Elevate extremity if swelling occurs  Continue the exercise program as prescribed by PT   Use walker, crutches or cane with weightbearing instructions as indicated by Dr. Evan Santana not ambulate without assistance until cleared by PT  IS Spirometer every 2 hrs while awake     Avoid sitting in low chairs or toilets  Do not cross legs  Keep your toes pointed forward while laying in bed     2. ( x  ) Initiate bowel care with the following medications   Over-The-Counter Senokot 1-2 tabs by mouth twice daily (or Over-The-Counter Colace (Docusate Sodium), continue taking while on narcotics, hold for loose stools.     3. ( x  ) Admit s/p (  X ) left     ( X  ) Anterior Total Hip Replacement                                 4. ( x  )  Physical Therapy - PT is not utilized after an Anterior Total Hip Replacement. - Walking/Ambulation is encouraged after surgery. Walking strengthens the hip in the post-operative period.      5. ( x  )  Weight bearing/limitations   ( X  ) left   (X) lower extremity                                                                  ( X  ) Full Weight Bearing      7.( x  ) Dressing/wound care  ( X ) Remove Prevena on post-operative day # 7. The battery pack attached to the purple dressing will automatically die 7 days after your surgery. A week after your surgery, peel off the dressing like you would a large band-aid and the entire dressing and battery pack may be thrown in the garbage.              - It is important to keep your incision covered for 2 weeks after surgery. After the Purple Prevena dressing is removed on post-op day 7, cover your incision with sterile gauze and tegaderm for another 7 days. You may change this dressing as needed when moist/wet.                  - If staples were used to close your incision: Staples are safe and may remain for up to 4 weeks post-operatively. These will be removed at your 3 week post-operative appointment.      - You may shower. No Tub Baths.  -Do not scrub wound. Pat dry with soft towel. -Claudette Leash    - Dr. Gavin Zepeda does not utilize home healthcare/home nursing after surgery.  It is not needed after this procedure.       8. (  x ) DVT PROPHYLAXIS  (  x ) Thigh/knee hi bridger hose bilateral lower extremities, OFF AT NIGHT  ( X ) Eliquis (Apixaban) 2.5 mg tablets 2 times a day for 4 weeks  ( X  ) If you are taking Xarelto (Rivaroxaban) or Eliquis (Apixaban), start Aspirin 325mg  2 x daily the day after you finish your Xarelto (Rivaroxaban) or Eliquis (Apixaban). Continue Aspirin until 6 weeks post op.        9. Take all medications ordered from Dr. Hoal Augustin office as directed at your pre-op appointment.     [phone]  Follow up with Dr. Juanjo Elder in 3 weeks for a post-operative appointment.     ELECTRONICALLY SIGNED BY: Temo Gill, 4/14/2019      For Kidney, Blood pressure edema, and electrolyte Problems :  Please get Renal panel  done in about a week and fax to [phone], Dr Bonnie Clinton, nephrologist at [facility] - also fax to Primary care Physician, and other specialties if appropriate. Please call  Phone no. [phone] with above mentioned issues. Please do not send other  home lora forms to the nephrologist. Thanks                Code Status:  Prior     Activity: activity as tolerated    Diet: regular diet      Discharge Medications:     Discharge Medication List as of 4/20/2019 11:32 AM           Details   HYDROcodone-acetaminophen (NORCO) 5-325 MG per tablet Take 1 tablet by mouth every 4 hours as needed for Pain for up to 7 days. , Disp-42 tablet, R-0Print      apixaban (ELIQUIS) 2.5 MG TABS tablet Take 1 tablet by mouth 2 times daily, Disp-60 tablet, R-0Print              Details   ARIPiprazole (ABILIFY) 5 MG tablet Take 5 mg by mouth 2 times dailyHistorical Med      atorvastatin (LIPITOR) 10 MG tablet Take 10 mg by mouth nightly Historical Med      ferrous sulfate 325 (65 Fe) MG tablet Take 325 mg by mouth daily (with breakfast)Historical Med      Multiple Vitamin (MULTIVITAMIN) tablet Take 1 tablet by mouth dailyHistorical Med      senna-docusate (SENEXON-S) 8.6-50 MG per tablet Take 1 tablet by mouth 2 times dailyHistorical Med      ketoconazole (NIZORAL) 2 % shampoo Apply

## 2019-04-20 NOTE — PLAN OF CARE
Problem: Falls - Risk of:  Goal: Will remain free from falls  Description   Patient in bed with alarm and nonskid socks on, 3/4 side rails up and bed locked in lowest position. Call light, belongings, and bedside table within reach. Patient educated on using call light and instructed to call out for assistance when getting out of bed, patient verbalized understanding. .     4/20/2019 0235 by Taco Kraus RN  Outcome: Ongoing  Note:   Hourly rounding on patient for needs. Non-skid socks on, bed in lowest position and locked. Bedside table, personal belongs, and nurse call light within reach. Instructed patient to use call light for assistance. Bed alarm on. Floor clear of clutter. Patient remains free of falls at this time. Will continue to monitor. Problem: Infection - Surgical Site:  Goal: Signs of wound healing will improve  Description  Signs of wound healing will improve  Outcome: Ongoing  Note:   JUAN MANUEL surgical site d/t dressing, dressing is clean/dry/intact. No odor noted, will continue to monitor. Problem: Pain - Acute:  Goal: Pain level will decrease  Description  Pain level will decrease  Outcome: Ongoing  Note:   Per advanced dementia scale patient does not exhibit any s/s of pain, will continue to monitor.

## 2019-05-03 ENCOUNTER — PRE-PROCEDURE TELEPHONE (OUTPATIENT)
Dept: CARDIAC CATH/INVASIVE PROCEDURES | Age: 64
End: 2019-05-03

## 2019-05-03 NOTE — PROGRESS NOTES
Called patient about procedure, talked to [address] at [address] [facility]. Told to be here at 1200 for procedure at 1330. NPO after midnight, but can take morning medication with sips of water, RN stated patient had two doses of eliquis today. told nurse to hold eliquis starting tomorrow. To have a responsible adult be with patient take them home and stay with them afterwards. And if available bring current list of medications. No other questions or concerns.

## 2019-05-06 ENCOUNTER — HOSPITAL ENCOUNTER (OUTPATIENT)
Dept: INTERVENTIONAL RADIOLOGY/VASCULAR | Age: 64
Discharge: HOME | End: 2019-05-06
Attending: RADIOLOGY | Admitting: RADIOLOGY
Payer: MEDICAID

## 2019-05-06 PROCEDURE — 36589 REMOVAL TUNNELED CV CATH: CPT | Performed by: RADIOLOGY

## 2019-05-06 NOTE — H&P
Patient:  Trip Elder   :   1955      Relevant patient history reviewed and discussed. The procedure including risks and benefits was discussed at length with the patient (or designated family member) and all questions were answered. Informed consent to proceed with the procedure was given. Condition : stable    [facility] nurses notes reviewed and agreed. Medications reviewed.   Allergies: No Known Allergies

## 2019-05-06 NOTE — PROCEDURES
IR Brief Postoperative Note    Zohreh Poster  YOB: 1955  [id number]    Pre-operative Diagnosis: completion of antibiotics    Post-operative Diagnosis: Same    Procedure: tunneled CVC removal    Anesthesia: none    Surgeons/Assistants: jad    Estimated Blood Loss: Minimal    Complications: none    Specimens: were not obtained    See full procedure dictation to follow      Mandie Nicolas MD  5/6/2019

## 2020-07-02 ENCOUNTER — HOSPITAL ENCOUNTER (OUTPATIENT)
Dept: VASCULAR LAB | Age: 65
Discharge: HOME | End: 2020-07-02
Payer: MEDICAID

## 2020-07-02 PROCEDURE — 93985 DUP-SCAN HEMO COMPL BI STD: CPT

## 2020-07-10 ENCOUNTER — OFFICE VISIT (OUTPATIENT)
Dept: VASCULAR SURGERY | Age: 65
End: 2020-07-10
Payer: MEDICAID

## 2020-07-10 VITALS
HEIGHT: 70 IN | WEIGHT: 216 LBS | SYSTOLIC BLOOD PRESSURE: 110 MMHG | BODY MASS INDEX: 30.92 KG/M2 | DIASTOLIC BLOOD PRESSURE: 70 MMHG

## 2020-07-10 PROCEDURE — 99243 OFF/OP CNSLTJ NEW/EST LOW 30: CPT | Performed by: SURGERY

## 2020-07-10 NOTE — PROGRESS NOTES
Outpatient Consultation / H&P    Date of Consultation:  7/10/2020    PCP:  Maine Melendez MD     Referring Provider:  Dr. Tracie Gant     Chief Complaint:   Chief Complaint   Patient presents with    Other     patient ref by DR Tracie Gant for acess for dialysis . pamlr        History of Present Illness: We are asked to see this patient in consultation by Dr. Tracie Gant regarding dialysis access. Lora Downs is a 77 y.o. male who has ESRD not yet on HD. He is right hand dominant. HE underwent Ultrasound vein mapping last week at [facility].       Past Medical History:  Past Medical History:   Diagnosis Date    Encounter for long-term (current) use of other medications     ESBL (extended spectrum beta-lactamase) producing bacteria infection 04/14/2019    urine    History of colonoscopy with polypectomy 6/06    Hypercholesterolemia     Neurodermatitis     Personal history of mental disorder     Schizophrenia    Personal history of schizophrenia 7/26/2010    Pure hypercholesterolemia 7/26/2010    Rosacea     acne    Smoker     Special screening for malignant neoplasm of prostate     Tobacco abuse     Tobacco use disorder 7/26/2010       Past Surgical History:  Past Surgical History:   Procedure Laterality Date    CYSTOSCOPY  4/14/2019    CYSTOSCOPY, URETHRAL DILATION performed by Bonny Dick MD at [address] [facility]      repair   [address] Left 4/14/2019    LEFT TOTAL HIP ARTHROPLASTY performed by Linnie Boxer, MD at [address] [facility] Medications:   Prior to Admission medications    Medication Sig Start Date End Date Taking?  Authorizing Provider   apixaban (ELIQUIS) 2.5 MG TABS tablet Take 1 tablet by mouth 2 times daily 4/14/19   Linnie Boxer, MD   ARIPiprazole (ABILIFY) 5 MG tablet Take 5 mg by mouth 2 times daily    Historical Provider, MD   atorvastatin (LIPITOR) 10 MG tablet Take 10 mg by mouth nightly     Historical Provider, MD   ferrous sulfate Not on file    Intimate partner violence     Fear of current or ex partner: Not on file     Emotionally abused: Not on file     Physically abused: Not on file     Forced sexual activity: Not on file   Other Topics Concern    Not on file   Social History Narrative    Not on file       Family History:    History reviewed. No pertinent family history. Review of Systems:  A 14 point review of systems was completed. Pertinent positives identified in the HPI, all other review of systems negative. Physical Examination:    /70 (Site: Right Upper Arm)   Ht 5' 10\" (1.778 m)   Wt 216 lb (98 kg)   BMI 30.99 kg/m²     Weight: 216 lb (98 kg)       General appearance: NAD  Eyes: PERRLA  Neck: no JVD, no lymphadenopathy. Respiratory: effort is unlabored, no crackles, wheezes or rubs. Cardiovascular: regular, no murmur. No carotid bruits. Pulses:    brachial radial   RIGHT 2 2   LEFT 2 2   GI: abdomen soft, nondistended, no organomegaly. Musculoskeletal: strength and tone normal.  Extremities: warm and pink. Skin: no dermatitis or ulceration. Neuro/psychiatric: grossly intact. MEDICAL DECISION MAKING/TESTING    Vein mapping:  Usable upper extremity veins of suitable diameter in upper arms. Assessment:      Diagnosis Orders   1. Encounter regarding vascular access for dialysis for ESRD [facility])           Recommendations/Plan:  Left upper arm Brachial Cephalic AVF. I do not think he would be a good candidate for an endovascular AVF with schizophrenia and need for f/u imaging etc.    I discussed: The intended benefits: a working arteriovenous fistula (AVF) is one of the best ways to allow effective haemodialysis needed to replace failed kidney function to sustain life. Many types on arms and sometimes legs can be used; the best for you will be discussed with you. Alternative treatments: peritoneal dialysis-uses a plastic tube inserted into the abdomen through which fluid is run in and out. Haemodialysis alternative - can be done through a plastic tube (line) into a major vein but is usually temporary because blockage and infections are common. It can also be done using a graft (a plastic tube embedded beneath the skin and connecting an artery and vein) but these, too are susceptible to infection and blockage. Serious or frequently occurring risks: none of these methods is durable and some fail immediately or within a year. We can sometimes get them going again by re-operating. Overall, around five to seven in ten are still working after two years and half after five years. The chances of success vary with the site of the AVF or graft and so do the complications. Up to one in ten patients experience a problem with wound healing which may require further surgery or prolonged dressings. Bleeding from the wound is rare because we take great care to avoid it but it may require a return to the operating theatre. Around one in ten patients develop a wound infection and need antibiotics. Sometimes too much blood flows through the fistula and not enough into the limb downstream, most commonly affecting the hand. This we call steal and it may require surgery to block the fistula despite which fingers are occasionally lost due to the poor blood supply. Over time some fistulas continue to enlarge (aneurysm) and may clot or bleed. Rarely nerves lying near the blood vessels are injured, causing numbness or weakness.         Himanshu Rai MD, FACS

## 2020-07-13 ENCOUNTER — TELEPHONE (OUTPATIENT)
Dept: VASCULAR SURGERY | Age: 65
End: 2020-07-13

## 2020-07-13 NOTE — TELEPHONE ENCOUNTER
Called patients nurse Venecia Acosta at [facility] regarding surgery for Thursday. Patient to arrive at 7:00am for 9:00am surgery. Npo after midnight. Nurse was given instructions regarding holding Eliquis days prior.  cassie

## 2020-07-20 ENCOUNTER — ANESTHESIA EVENT (OUTPATIENT)
Dept: OPERATING ROOM | Age: 65
End: 2020-07-20
Payer: MEDICAID

## 2020-07-21 RX ORDER — ASPIRIN 81 MG/1
81 TABLET ORAL DAILY
COMMUNITY

## 2020-07-21 NOTE — PROGRESS NOTES
Preoperative Screening for Elective Surgery/Invasive Procedures While COVID-19 present in the community     Have you tested positive or have been told to self-isolate for COVID-19 like symptoms within the past 28 days? NO  o Do you currently have any of the following symptoms? NO  o Fever >100.0 F or 99.9 F in immunocompromised patients? NO  o New onset cough, shortness of breath or difficulty breathing? NO  o New onset sore throat, myalgia (muscle aches and pains), headache, loss of taste/smell or diarrhea? NO   Have you had a potential exposure to COVID-19 within the past 14 days by:NO  o Close contact with a confirmed case? NO  o Close contact with a healthcare worker,  or essential infrastructure worker (grocery store, [employer], gas station, public utilities or transportation)? NO  o Do you reside in a congregate setting such as; skilled nursing facility, adult home, correctional facility, homeless shelter or other institutional setting? NO  o Have you had recent travel to a known COVID-19 hotspot? NO    Indicate if the patient has a positive screen by answering yes to one or more of the above questions. Patients who test positive or screen positive prior to surgery or on the day of surgery should be evaluated in conjunction with the surgeon/proceduralist/anesthesiologist to determine the urgency of the procedure.

## 2020-07-23 ENCOUNTER — HOSPITAL ENCOUNTER (OUTPATIENT)
Age: 65
Setting detail: OUTPATIENT SURGERY
Discharge: HOME | End: 2020-07-23
Attending: SURGERY | Admitting: SURGERY
Payer: MEDICAID

## 2020-07-23 ENCOUNTER — ANESTHESIA (OUTPATIENT)
Dept: OPERATING ROOM | Age: 65
End: 2020-07-23
Payer: MEDICAID

## 2020-07-23 VITALS
DIASTOLIC BLOOD PRESSURE: 89 MMHG | RESPIRATION RATE: 15 BRPM | TEMPERATURE: 97.4 F | WEIGHT: 217.4 LBS | HEIGHT: 72 IN | HEART RATE: 77 BPM | SYSTOLIC BLOOD PRESSURE: 115 MMHG | BODY MASS INDEX: 29.45 KG/M2 | OXYGEN SATURATION: 95 %

## 2020-07-23 VITALS
SYSTOLIC BLOOD PRESSURE: 130 MMHG | OXYGEN SATURATION: 97 % | RESPIRATION RATE: 6 BRPM | DIASTOLIC BLOOD PRESSURE: 72 MMHG

## 2020-07-23 LAB
ANION GAP SERPL CALCULATED.3IONS-SCNC: 13 MMOL/L (ref 3–16)
APTT: 28.6 SEC (ref 24.2–36.2)
BUN BLDV-MCNC: 51 MG/DL (ref 7–20)
CALCIUM SERPL-MCNC: 9.2 MG/DL (ref 8.3–10.6)
CHLORIDE BLD-SCNC: 108 MMOL/L (ref 99–110)
CO2: 16 MMOL/L (ref 21–32)
CREAT SERPL-MCNC: 6.2 MG/DL (ref 0.8–1.3)
EKG ATRIAL RATE: 73 BPM
EKG DIAGNOSIS: NORMAL
EKG P AXIS: 73 DEGREES
EKG P-R INTERVAL: 202 MS
EKG Q-T INTERVAL: 404 MS
EKG QRS DURATION: 98 MS
EKG QTC CALCULATION (BAZETT): 445 MS
EKG R AXIS: -11 DEGREES
EKG T AXIS: 38 DEGREES
EKG VENTRICULAR RATE: 73 BPM
GFR AFRICAN AMERICAN: 11
GFR NON-AFRICAN AMERICAN: 9
GLUCOSE BLD-MCNC: 91 MG/DL (ref 70–99)
HCT VFR BLD CALC: 36.8 % (ref 40.5–52.5)
HEMOGLOBIN: 12 G/DL (ref 13.5–17.5)
INR BLD: 1.03 (ref 0.86–1.14)
MAGNESIUM: 2.3 MG/DL (ref 1.8–2.4)
MCH RBC QN AUTO: 29.9 PG (ref 26–34)
MCHC RBC AUTO-ENTMCNC: 32.6 G/DL (ref 31–36)
MCV RBC AUTO: 91.6 FL (ref 80–100)
PDW BLD-RTO: 14.6 % (ref 12.4–15.4)
PLATELET # BLD: 365 K/UL (ref 135–450)
PMV BLD AUTO: 7.7 FL (ref 5–10.5)
POTASSIUM REFLEX MAGNESIUM: 3.5 MMOL/L (ref 3.5–5.1)
PROTHROMBIN TIME: 11.9 SEC (ref 10–13.2)
RBC # BLD: 4.02 M/UL (ref 4.2–5.9)
SARS-COV-2, NAAT: NOT DETECTED
SODIUM BLD-SCNC: 137 MMOL/L (ref 136–145)
WBC # BLD: 9 K/UL (ref 4–11)

## 2020-07-23 PROCEDURE — 36821 AV FUSION DIRECT ANY SITE: CPT | Performed by: SURGERY

## 2020-07-23 PROCEDURE — 36592 COLLECT BLOOD FROM PICC: CPT

## 2020-07-23 PROCEDURE — 2580000003 HC RX 258: Performed by: ANESTHESIOLOGY

## 2020-07-23 PROCEDURE — 85610 PROTHROMBIN TIME: CPT

## 2020-07-23 PROCEDURE — 7100000000 HC PACU RECOVERY - FIRST 15 MIN: Performed by: SURGERY

## 2020-07-23 PROCEDURE — 7100000011 HC PHASE II RECOVERY - ADDTL 15 MIN: Performed by: SURGERY

## 2020-07-23 PROCEDURE — 3700000000 HC ANESTHESIA ATTENDED CARE: Performed by: SURGERY

## 2020-07-23 PROCEDURE — 85730 THROMBOPLASTIN TIME PARTIAL: CPT

## 2020-07-23 PROCEDURE — 6360000002 HC RX W HCPCS: Performed by: ANESTHESIOLOGY

## 2020-07-23 PROCEDURE — 6360000002 HC RX W HCPCS: Performed by: NURSE ANESTHETIST, CERTIFIED REGISTERED

## 2020-07-23 PROCEDURE — 85027 COMPLETE CBC AUTOMATED: CPT

## 2020-07-23 PROCEDURE — 2580000003 HC RX 258: Performed by: SURGERY

## 2020-07-23 PROCEDURE — 80048 BASIC METABOLIC PNL TOTAL CA: CPT

## 2020-07-23 PROCEDURE — 7100000001 HC PACU RECOVERY - ADDTL 15 MIN: Performed by: SURGERY

## 2020-07-23 PROCEDURE — 2500000003 HC RX 250 WO HCPCS: Performed by: NURSE ANESTHETIST, CERTIFIED REGISTERED

## 2020-07-23 PROCEDURE — 3600000004 HC SURGERY LEVEL 4 BASE: Performed by: SURGERY

## 2020-07-23 PROCEDURE — U0002 COVID-19 LAB TEST NON-CDC: HCPCS

## 2020-07-23 PROCEDURE — 3700000001 HC ADD 15 MINUTES (ANESTHESIA): Performed by: SURGERY

## 2020-07-23 PROCEDURE — 7100000010 HC PHASE II RECOVERY - FIRST 15 MIN: Performed by: SURGERY

## 2020-07-23 PROCEDURE — 2500000003 HC RX 250 WO HCPCS: Performed by: ANESTHESIOLOGY

## 2020-07-23 PROCEDURE — 6360000002 HC RX W HCPCS: Performed by: SURGERY

## 2020-07-23 PROCEDURE — 2709999900 HC NON-CHARGEABLE SUPPLY: Performed by: SURGERY

## 2020-07-23 PROCEDURE — 3600000014 HC SURGERY LEVEL 4 ADDTL 15MIN: Performed by: SURGERY

## 2020-07-23 PROCEDURE — 83735 ASSAY OF MAGNESIUM: CPT

## 2020-07-23 RX ORDER — MEPERIDINE HYDROCHLORIDE 50 MG/ML
12.5 INJECTION INTRAMUSCULAR; INTRAVENOUS; SUBCUTANEOUS EVERY 5 MIN PRN
Status: DISCONTINUED | OUTPATIENT
Start: 2020-07-23 | End: 2020-07-23 | Stop reason: HOSPADM

## 2020-07-23 RX ORDER — PROPOFOL 10 MG/ML
INJECTION, EMULSION INTRAVENOUS PRN
Status: DISCONTINUED | OUTPATIENT
Start: 2020-07-23 | End: 2020-07-23 | Stop reason: SDUPTHER

## 2020-07-23 RX ORDER — ROCURONIUM BROMIDE 10 MG/ML
INJECTION, SOLUTION INTRAVENOUS PRN
Status: DISCONTINUED | OUTPATIENT
Start: 2020-07-23 | End: 2020-07-23 | Stop reason: SDUPTHER

## 2020-07-23 RX ORDER — OXYCODONE HYDROCHLORIDE AND ACETAMINOPHEN 5; 325 MG/1; MG/1
1 TABLET ORAL PRN
Status: DISCONTINUED | OUTPATIENT
Start: 2020-07-23 | End: 2020-07-23 | Stop reason: HOSPADM

## 2020-07-23 RX ORDER — LABETALOL HYDROCHLORIDE 5 MG/ML
5 INJECTION, SOLUTION INTRAVENOUS EVERY 10 MIN PRN
Status: DISCONTINUED | OUTPATIENT
Start: 2020-07-23 | End: 2020-07-23 | Stop reason: HOSPADM

## 2020-07-23 RX ORDER — HYDRALAZINE HYDROCHLORIDE 20 MG/ML
5 INJECTION INTRAMUSCULAR; INTRAVENOUS EVERY 10 MIN PRN
Status: DISCONTINUED | OUTPATIENT
Start: 2020-07-23 | End: 2020-07-23 | Stop reason: HOSPADM

## 2020-07-23 RX ORDER — ONDANSETRON 2 MG/ML
INJECTION INTRAMUSCULAR; INTRAVENOUS PRN
Status: DISCONTINUED | OUTPATIENT
Start: 2020-07-23 | End: 2020-07-23 | Stop reason: SDUPTHER

## 2020-07-23 RX ORDER — SODIUM CHLORIDE 0.9 % (FLUSH) 0.9 %
10 SYRINGE (ML) INJECTION EVERY 12 HOURS SCHEDULED
Status: DISCONTINUED | OUTPATIENT
Start: 2020-07-23 | End: 2020-07-23 | Stop reason: HOSPADM

## 2020-07-23 RX ORDER — OXYCODONE HYDROCHLORIDE AND ACETAMINOPHEN 5; 325 MG/1; MG/1
2 TABLET ORAL PRN
Status: DISCONTINUED | OUTPATIENT
Start: 2020-07-23 | End: 2020-07-23 | Stop reason: HOSPADM

## 2020-07-23 RX ORDER — SODIUM CHLORIDE, SODIUM LACTATE, POTASSIUM CHLORIDE, CALCIUM CHLORIDE 600; 310; 30; 20 MG/100ML; MG/100ML; MG/100ML; MG/100ML
INJECTION, SOLUTION INTRAVENOUS CONTINUOUS
Status: DISCONTINUED | OUTPATIENT
Start: 2020-07-23 | End: 2020-07-23 | Stop reason: HOSPADM

## 2020-07-23 RX ORDER — SODIUM CHLORIDE 0.9 % (FLUSH) 0.9 %
10 SYRINGE (ML) INJECTION PRN
Status: DISCONTINUED | OUTPATIENT
Start: 2020-07-23 | End: 2020-07-23 | Stop reason: HOSPADM

## 2020-07-23 RX ORDER — LIDOCAINE HYDROCHLORIDE 20 MG/ML
INJECTION, SOLUTION EPIDURAL; INFILTRATION; INTRACAUDAL; PERINEURAL PRN
Status: DISCONTINUED | OUTPATIENT
Start: 2020-07-23 | End: 2020-07-23 | Stop reason: SDUPTHER

## 2020-07-23 RX ORDER — DEXAMETHASONE SODIUM PHOSPHATE 10 MG/ML
INJECTION INTRAMUSCULAR; INTRAVENOUS PRN
Status: DISCONTINUED | OUTPATIENT
Start: 2020-07-23 | End: 2020-07-23 | Stop reason: SDUPTHER

## 2020-07-23 RX ORDER — FENTANYL CITRATE 50 UG/ML
INJECTION, SOLUTION INTRAMUSCULAR; INTRAVENOUS PRN
Status: DISCONTINUED | OUTPATIENT
Start: 2020-07-23 | End: 2020-07-23 | Stop reason: SDUPTHER

## 2020-07-23 RX ORDER — ONDANSETRON 2 MG/ML
4 INJECTION INTRAMUSCULAR; INTRAVENOUS EVERY 10 MIN PRN
Status: DISCONTINUED | OUTPATIENT
Start: 2020-07-23 | End: 2020-07-23 | Stop reason: HOSPADM

## 2020-07-23 RX ADMIN — LIDOCAINE HYDROCHLORIDE 50 MG: 20 INJECTION, SOLUTION EPIDURAL; INFILTRATION; INTRACAUDAL; PERINEURAL at 08:13

## 2020-07-23 RX ADMIN — ONDANSETRON 4 MG: 2 INJECTION INTRAMUSCULAR; INTRAVENOUS at 08:35

## 2020-07-23 RX ADMIN — ROCURONIUM BROMIDE 50 MG: 10 SOLUTION INTRAVENOUS at 08:13

## 2020-07-23 RX ADMIN — DEXAMETHASONE SODIUM PHOSPHATE 8 MG: 10 INJECTION INTRAMUSCULAR; INTRAVENOUS at 08:35

## 2020-07-23 RX ADMIN — FENTANYL CITRATE 50 MCG: 50 INJECTION, SOLUTION INTRAMUSCULAR; INTRAVENOUS at 08:13

## 2020-07-23 RX ADMIN — PROPOFOL 150 MG: 10 INJECTION, EMULSION INTRAVENOUS at 08:13

## 2020-07-23 RX ADMIN — FAMOTIDINE 20 MG: 10 INJECTION, SOLUTION INTRAVENOUS at 07:10

## 2020-07-23 RX ADMIN — SODIUM CHLORIDE, POTASSIUM CHLORIDE, SODIUM LACTATE AND CALCIUM CHLORIDE: 600; 310; 30; 20 INJECTION, SOLUTION INTRAVENOUS at 07:32

## 2020-07-23 RX ADMIN — CEFAZOLIN SODIUM 2 G: 10 INJECTION, POWDER, FOR SOLUTION INTRAVENOUS at 08:09

## 2020-07-23 RX ADMIN — HYDROMORPHONE HYDROCHLORIDE 0.25 MG: 1 INJECTION, SOLUTION INTRAMUSCULAR; INTRAVENOUS; SUBCUTANEOUS at 10:02

## 2020-07-23 RX ADMIN — SODIUM CHLORIDE, POTASSIUM CHLORIDE, SODIUM LACTATE AND CALCIUM CHLORIDE: 600; 310; 30; 20 INJECTION, SOLUTION INTRAVENOUS at 07:07

## 2020-07-23 RX ADMIN — FENTANYL CITRATE 50 MCG: 50 INJECTION, SOLUTION INTRAMUSCULAR; INTRAVENOUS at 08:35

## 2020-07-23 RX ADMIN — SUGAMMADEX 100 MG: 100 INJECTION, SOLUTION INTRAVENOUS at 09:00

## 2020-07-23 ASSESSMENT — PULMONARY FUNCTION TESTS
PIF_VALUE: 18
PIF_VALUE: 23
PIF_VALUE: 1
PIF_VALUE: 18
PIF_VALUE: 1
PIF_VALUE: 18
PIF_VALUE: 19
PIF_VALUE: 18
PIF_VALUE: 1
PIF_VALUE: 18
PIF_VALUE: 6
PIF_VALUE: 2
PIF_VALUE: 18
PIF_VALUE: 13
PIF_VALUE: 18
PIF_VALUE: 23
PIF_VALUE: 18
PIF_VALUE: 28
PIF_VALUE: 18
PIF_VALUE: 1
PIF_VALUE: 18
PIF_VALUE: 17
PIF_VALUE: 3
PIF_VALUE: 17
PIF_VALUE: 19
PIF_VALUE: 1
PIF_VALUE: 18
PIF_VALUE: 1
PIF_VALUE: 18
PIF_VALUE: 1
PIF_VALUE: 3
PIF_VALUE: 18
PIF_VALUE: 1
PIF_VALUE: 24
PIF_VALUE: 9
PIF_VALUE: 19
PIF_VALUE: 9
PIF_VALUE: 18
PIF_VALUE: 18

## 2020-07-23 ASSESSMENT — PAIN DESCRIPTION - LOCATION: LOCATION: ARM

## 2020-07-23 ASSESSMENT — PAIN DESCRIPTION - ORIENTATION: ORIENTATION: LEFT

## 2020-07-23 ASSESSMENT — PAIN SCALES - GENERAL
PAINLEVEL_OUTOF10: 9
PAINLEVEL_OUTOF10: 9
PAINLEVEL_OUTOF10: 6

## 2020-07-23 ASSESSMENT — PAIN DESCRIPTION - DESCRIPTORS: DESCRIPTORS: ACHING

## 2020-07-23 ASSESSMENT — PAIN - FUNCTIONAL ASSESSMENT: PAIN_FUNCTIONAL_ASSESSMENT: 0-10

## 2020-07-23 ASSESSMENT — PAIN DESCRIPTION - DIRECTION: RADIATING_TOWARDS: MB

## 2020-07-23 NOTE — PROGRESS NOTES
Patient arrived to PACU and placed on monitor. Patient had oral airway in place but awoke quickly and this was removed. Report received from OR RN, all questions answered. + bruit and thrill at site with + radial pulse LUE.  Will continue to monitor

## 2020-07-23 NOTE — ANESTHESIA PRE PROCEDURE
Department of Anesthesiology  Preprocedure Note       Name:  Celso Edwards   Age:  77 y.o.  :  1955                                          MRN:  [id number]         Date:  2020      Surgeon: Tracie Man):  Clinton Quiñonez MD    Procedure: Procedure(s):  LEFT UPPER EXTREMITY BRACHIAL CEPHALIC ARTERIAL VENOUS FISTULA    Medications prior to admission:   Prior to Admission medications    Medication Sig Start Date End Date Taking? Authorizing Provider   aspirin 81 MG EC tablet Take 81 mg by mouth daily   Yes Historical Provider, MD   ARIPiprazole (ABILIFY) 5 MG tablet Take 5 mg by mouth every evening     Historical Provider, MD   atorvastatin (LIPITOR) 10 MG tablet Take 10 mg by mouth nightly     Historical Provider, MD   ferrous sulfate 325 (65 Fe) MG tablet Take 325 mg by mouth daily (with breakfast)    Historical Provider, MD   Multiple Vitamin (MULTIVITAMIN) tablet Take 1 tablet by mouth daily    Historical Provider, MD   senna-docusate (SENEXON-S) 8.6-50 MG per tablet Take 1 tablet by mouth 2 times daily    Historical Provider, MD   acetaminophen (TYLENOL) 325 MG tablet Take 650 mg by mouth every 4 hours as needed for Pain    Historical Provider, MD   ketoconazole (NIZORAL) 2 % shampoo Apply topically Twice a Week With shower. Historical Provider, MD   tamsulosin (FLOMAX) 0.4 MG capsule Take 0.8 mg by mouth nightly    Historical Provider, MD   Cholecalciferol (VITAMIN D3) 33961 UNITS CAPS Take 1 capsule by mouth every 30 days    Historical Provider, MD   Melatonin 5 MG CAPS Take 1 capsule by mouth nightly    Historical Provider, MD       Current medications:    No current facility-administered medications for this encounter.       Current Outpatient Medications   Medication Sig Dispense Refill    aspirin 81 MG EC tablet Take 81 mg by mouth daily      ARIPiprazole (ABILIFY) 5 MG tablet Take 5 mg by mouth every evening       atorvastatin (LIPITOR) 10 MG tablet Take 10 mg by mouth nightly       ferrous sulfate 325 (65 Fe) MG tablet Take 325 mg by mouth daily (with breakfast)      Multiple Vitamin (MULTIVITAMIN) tablet Take 1 tablet by mouth daily      senna-docusate (SENEXON-S) 8.6-50 MG per tablet Take 1 tablet by mouth 2 times daily      acetaminophen (TYLENOL) 325 MG tablet Take 650 mg by mouth every 4 hours as needed for Pain      ketoconazole (NIZORAL) 2 % shampoo Apply topically Twice a Week With shower.       tamsulosin (FLOMAX) 0.4 MG capsule Take 0.8 mg by mouth nightly      Cholecalciferol (VITAMIN D3) 08291 UNITS CAPS Take 1 capsule by mouth every 30 days      Melatonin 5 MG CAPS Take 1 capsule by mouth nightly         Allergies:  No Known Allergies    Problem List:    Patient Active Problem List   Diagnosis Code    Pure hypercholesterolemia E78.00    Tobacco use disorder F17.200    Personal history of schizophrenia Z86.59    Rosacea L71.9    Pneumonia J18.9    Hip fracture requiring operative repair, left, closed, initial encounter ([facility] C65.308Z    Complicated UTI (urinary tract infection) N39.0    LIANA (acute kidney injury) ([facility] N17.9    UTI due to extended-spectrum beta lactamase (ESBL) producing Escherichia coli N39.0, B96.29, Z16.12       Past Medical History:        Diagnosis Date    Encounter for long-term (current) use of other medications     ESBL (extended spectrum beta-lactamase) producing bacteria infection 04/14/2019    urine    ESRD (end stage renal disease) ([facility]     History of colonoscopy with polypectomy 6/06    Hypercholesterolemia     Neurodermatitis     Personal history of mental disorder     Schizophrenia    Personal history of schizophrenia 7/26/2010    Pure hypercholesterolemia 7/26/2010    Rosacea     acne    Smoker     Special screening for malignant neoplasm of prostate     Tobacco abuse     Tobacco use disorder 7/26/2010       Past Surgical History:        Procedure Laterality Date    CYSTOSCOPY  4/14/2019    CYSTOSCOPY, URETHRAL DILATION 7.45 06/03/2017    PO2ART 62.6 06/03/2017    LUV6GGM 28 06/03/2017    QCY1QGX 19 06/03/2017    BEART -3.5 06/03/2017    L5WTXUGE 93 06/03/2017        Type & Screen (If Applicable):  No results found for: LABABO, LABRH    Drug/Infectious Status (If Applicable):  No results found for: HIV, HEPCAB    COVID-19 Screening (If Applicable): No results found for: COVID19      Anesthesia Evaluation  Patient summary reviewed and Nursing notes reviewed no history of anesthetic complications:   Airway: Mallampati: II  TM distance: >3 FB   Neck ROM: full  Mouth opening: > = 3 FB Dental: normal exam         Pulmonary:Negative Pulmonary ROS   (+) pneumonia:                             Cardiovascular:Negative CV ROS                      Neuro/Psych:   (+) psychiatric history (schizophrenia):            GI/Hepatic/Renal:   (+) renal disease: ESRD,      (-) GERD and liver disease       Endo/Other:    (+) malignancy/cancer (prostate). (-) diabetes mellitus               Abdominal:           Vascular: negative vascular ROS. Anesthesia Plan      general     ASA 3     (I discussed with the patient the risks and benefits of PIV, general anesthesia, IV Narcotics, PACU. All questions were answered the patient agrees with the plan)  Induction: intravenous. MIPS: Prophylactic antiemetics administered. Anesthetic plan and risks discussed with patient. Plan discussed with CRNA.                   Antonieta Gerard MD   7/23/2020

## 2020-07-23 NOTE — H&P
I have reviewed the history and physical (See note dated 7/10/2020) and examined the patient and find no relevant changes. I have reviewed with the patient and/or family the risks, benefits, and alternatives to the procedure.

## 2020-07-23 NOTE — PROGRESS NOTES
OR nurse Rigo Liu is aware of patients irritation from his left arm hair clipping. Pt was wiped after clipping both with aloe wipes and antibacterial wipes to try and soothe irritation.

## 2020-07-23 NOTE — ANESTHESIA POSTPROCEDURE EVALUATION
Department of Anesthesiology  Postprocedure Note    Patient: Felipe Neil  MRN: [id number]  YOB: 1955  Date of evaluation: 7/23/2020  Time:  2:48 PM     Procedure Summary     Date:  07/23/20 Room / Location:  [place] [address] (HYBRID) / [facility]    Anesthesia Start:  7638 Anesthesia Stop:  0258    Procedure:  LEFT UPPER EXTREMITY BRACHIAL CEPHALIC ARTERIAL VENOUS FISTULA (Left Arm Upper) Diagnosis:       End stage renal disease ([address]      (RENAL FAILURE)    Surgeon: Brayan Benitez MD Responsible Provider:  Keegan Carrion MD    Anesthesia Type:  general ASA Status:  3          Anesthesia Type: general    She Phase I: She Score: 10    She Phase II: She Score: 10    Last vitals: Reviewed and per EMR flowsheets.        Anesthesia Post Evaluation    Patient location during evaluation: PACU  Level of consciousness: awake  Airway patency: patent  Nausea & Vomiting: no nausea  Complications: no  Cardiovascular status: blood pressure returned to baseline  Respiratory status: acceptable  Hydration status: euvolemic

## 2020-07-23 NOTE — PROGRESS NOTES
Received report from Nathalie/linda from PACU and pt is alert and oriented. He is eating ice. His skin irritation from his clipping has improved, less redness.

## 2020-07-23 NOTE — BRIEF OP NOTE
Brief Postoperative Note      Patient: Kobi Castorena  YOB: 1955  MRN: [id number]    Date of Procedure: 7/23/2020    Pre-Op Diagnosis: RENAL FAILURE    Post-Op Diagnosis: Same       Procedure(s):  LEFT UPPER EXTREMITY BRACHIAL CEPHALIC ARTERIAL VENOUS FISTULA    Surgeon(s):  Tana Osuna MD    Assistant:  Surgical Assistant: Kylee Lora    Anesthesia: General    Estimated Blood Loss (mL): Minimal    Complications: None    Specimens:   * No specimens in log *    Implants:  * No implants in log *      Drains:   Negative Pressure Wound Therapy Leg Anterior;Left;Proximal;Upper (Active)           Electronically signed by Tana Osuna MD on 7/23/2020 at 9:08 AM

## 2020-07-24 NOTE — OP NOTE
[facility]                                OPERATIVE REPORT    PATIENT NAME: Charity Weathers                     :        1955  MED REC NO:   [id number]                          ROOM:  ACCOUNT NO:   [de-identified]                           ADMIT DATE: 2020  PROVIDER:     Jameel Rondon MD    DATE OF PROCEDURE:  2020    PREOPERATIVE DIAGNOSIS:  End-stage renal disease, on hemodialysis. POSTOPERATIVE DIAGNOSIS:  End-stage renal disease, on hemodialysis. PROCEDURE:  Left upper arm brachiocephalic AV fistula. ANESTHESIA:  General.    INDICATIONS:  The patient is a 47-year-old male with end-stage renal  disease, on hemodialysis. He is brought to the operating room at this  time to establish permanent dialysis access. PROCEDURE:  The patient was brought to the operating room, placed in  supine position. Ultrasound was performed of the left arm identifying  the brachial artery and the cephalic vein just above the antecubital  crease. The arm was then prepped and draped in sterile fashion. Incision was made over the brachial artery. This was dissected and  exposed for several centimeters, encircled proximally and distally with  vessel loops. A small incision was then made just laterally over the  marked cephalic vein. The cephalic vein was mobilized for several  centimeters. Side branches ligated using 3-0 silk ties and divided. The vein was then ligated proximally at the antecubital crease. The  vein was flushed with heparinized saline solution. It was then passed  through a subcutaneous tunnel between the two incisions. Flow in the  brachial artery was occluded, constricting the vessel loops. A small  arteriotomy was made in the _____, widened using a 4-mm aortic punch. An end-to-side anastomosis was performed between the vein and the artery  using running 6-0 Prolene suture.   After completing the anastomosis,  vessel loops were released, flow was established into the fistula and  down the forearm. There was an excellent palpable thrill. There were  good Doppler signals in the brachial artery proximal and distal to the  fistula and an excellent Doppler signal in the radial artery at the  wrist, ulnar artery at the wrist, and the superficial palmar arch. The  incisions were irrigated with antibiotic saline solution and closed  using running 3-0 Vicryl subcutaneous sutures and running 4-0 Monocryl  subcuticular sutures to reapproximate the skin edges. The wounds were  dressed with Dermabond. There were no complications to this procedure. At the end of procedure, sponge, needle, and instrument counts were  correct. The patient was extubated in the operating room, transferred  to the recovery area in stable and awake condition.   Estimated blood  loss was minimal.        Malka Shaver MD    D: 07/23/2020 13:03:26       T: 07/23/2020 13:12:06     [id number]  Job#: [id number]     Doc#: [id number]    CC:

## 2021-06-02 ENCOUNTER — CLINICAL DOCUMENTATION (OUTPATIENT)
Dept: OTHER | Age: 66
End: 2021-06-02

## 2024-11-24 ENCOUNTER — APPOINTMENT (OUTPATIENT)
Dept: CT IMAGING | Age: 69
End: 2024-11-24
Payer: COMMERCIAL

## 2024-11-24 ENCOUNTER — HOSPITAL ENCOUNTER (EMERGENCY)
Age: 69
Discharge: NURSING, REHAB OR LONG-TERM CARE | End: 2024-11-24
Attending: EMERGENCY MEDICINE
Payer: COMMERCIAL

## 2024-11-24 ENCOUNTER — APPOINTMENT (OUTPATIENT)
Dept: GENERAL RADIOLOGY | Age: 69
End: 2024-11-24
Payer: COMMERCIAL

## 2024-11-24 VITALS
HEIGHT: 71 IN | DIASTOLIC BLOOD PRESSURE: 47 MMHG | HEART RATE: 64 BPM | BODY MASS INDEX: 24.98 KG/M2 | WEIGHT: 178.4 LBS | OXYGEN SATURATION: 100 % | RESPIRATION RATE: 20 BRPM | SYSTOLIC BLOOD PRESSURE: 75 MMHG

## 2024-11-24 DIAGNOSIS — E87.5 HYPERKALEMIA: ICD-10-CM

## 2024-11-24 DIAGNOSIS — N18.6 ESRD (END STAGE RENAL DISEASE) (HCC): Primary | ICD-10-CM

## 2024-11-24 DIAGNOSIS — N19 UREMIA: ICD-10-CM

## 2024-11-24 DIAGNOSIS — Z66 DNR (DO NOT RESUSCITATE): ICD-10-CM

## 2024-11-24 LAB
ANION GAP SERPL CALCULATED.3IONS-SCNC: 33 MMOL/L (ref 3–16)
BASOPHILS # BLD: 0 K/UL (ref 0–0.2)
BASOPHILS NFR BLD: 0.2 %
BUN SERPL-MCNC: 244 MG/DL (ref 7–20)
CALCIUM SERPL-MCNC: 8.4 MG/DL (ref 8.3–10.6)
CHLORIDE SERPL-SCNC: 96 MMOL/L (ref 99–110)
CO2 SERPL-SCNC: 3 MMOL/L (ref 21–32)
CREAT SERPL-MCNC: 23.8 MG/DL (ref 0.8–1.3)
DEPRECATED RDW RBC AUTO: 14.7 % (ref 12.4–15.4)
EKG ATRIAL RATE: 62 BPM
EKG DIAGNOSIS: NORMAL
EKG Q-T INTERVAL: 484 MS
EKG QRS DURATION: 122 MS
EKG QTC CALCULATION (BAZETT): 491 MS
EKG R AXIS: -26 DEGREES
EKG T AXIS: 47 DEGREES
EKG VENTRICULAR RATE: 62 BPM
EOSINOPHIL # BLD: 0 K/UL (ref 0–0.6)
EOSINOPHIL NFR BLD: 0 %
GFR SERPLBLD CREATININE-BSD FMLA CKD-EPI: 2 ML/MIN/{1.73_M2}
GLUCOSE SERPL-MCNC: 99 MG/DL (ref 70–99)
HCT VFR BLD AUTO: 31.3 % (ref 40.5–52.5)
HGB BLD-MCNC: 10.2 G/DL (ref 13.5–17.5)
LACTATE BLDV-SCNC: 1.4 MMOL/L (ref 0.4–2)
LYMPHOCYTES # BLD: 0.7 K/UL (ref 1–5.1)
LYMPHOCYTES NFR BLD: 3 %
MAGNESIUM SERPL-MCNC: 3.27 MG/DL (ref 1.8–2.4)
MCH RBC QN AUTO: 29 PG (ref 26–34)
MCHC RBC AUTO-ENTMCNC: 32.6 G/DL (ref 31–36)
MCV RBC AUTO: 89 FL (ref 80–100)
MONOCYTES # BLD: 1.2 K/UL (ref 0–1.3)
MONOCYTES NFR BLD: 5.4 %
NEUTROPHILS # BLD: 20.5 K/UL (ref 1.7–7.7)
NEUTROPHILS NFR BLD: 91.4 %
PHOSPHATE SERPL-MCNC: 16.5 MG/DL (ref 2.5–4.9)
PLATELET # BLD AUTO: 439 K/UL (ref 135–450)
PMV BLD AUTO: 7.2 FL (ref 5–10.5)
POTASSIUM SERPL-SCNC: 7.8 MMOL/L (ref 3.5–5.1)
RBC # BLD AUTO: 3.52 M/UL (ref 4.2–5.9)
SODIUM SERPL-SCNC: 132 MMOL/L (ref 136–145)
TROPONIN, HIGH SENSITIVITY: 98 NG/L (ref 0–22)
WBC # BLD AUTO: 22.4 K/UL (ref 4–11)

## 2024-11-24 PROCEDURE — 71045 X-RAY EXAM CHEST 1 VIEW: CPT

## 2024-11-24 PROCEDURE — 99285 EMERGENCY DEPT VISIT HI MDM: CPT

## 2024-11-24 PROCEDURE — 83605 ASSAY OF LACTIC ACID: CPT

## 2024-11-24 PROCEDURE — 2500000003 HC RX 250 WO HCPCS

## 2024-11-24 PROCEDURE — 84484 ASSAY OF TROPONIN QUANT: CPT

## 2024-11-24 PROCEDURE — 80048 BASIC METABOLIC PNL TOTAL CA: CPT

## 2024-11-24 PROCEDURE — 2580000003 HC RX 258: Performed by: EMERGENCY MEDICINE

## 2024-11-24 PROCEDURE — 85025 COMPLETE CBC W/AUTO DIFF WBC: CPT

## 2024-11-24 PROCEDURE — 84100 ASSAY OF PHOSPHORUS: CPT

## 2024-11-24 PROCEDURE — 70450 CT HEAD/BRAIN W/O DYE: CPT

## 2024-11-24 PROCEDURE — 96365 THER/PROPH/DIAG IV INF INIT: CPT

## 2024-11-24 PROCEDURE — 83735 ASSAY OF MAGNESIUM: CPT

## 2024-11-24 PROCEDURE — 6360000002 HC RX W HCPCS: Performed by: EMERGENCY MEDICINE

## 2024-11-24 PROCEDURE — 96361 HYDRATE IV INFUSION ADD-ON: CPT

## 2024-11-24 PROCEDURE — 96375 TX/PRO/DX INJ NEW DRUG ADDON: CPT

## 2024-11-24 PROCEDURE — 93005 ELECTROCARDIOGRAM TRACING: CPT | Performed by: EMERGENCY MEDICINE

## 2024-11-24 RX ORDER — 0.9 % SODIUM CHLORIDE 0.9 %
1000 INTRAVENOUS SOLUTION INTRAVENOUS ONCE
Status: COMPLETED | OUTPATIENT
Start: 2024-11-24 | End: 2024-11-24

## 2024-11-24 RX ORDER — CALCIUM GLUCONATE 20 MG/ML
1000 INJECTION, SOLUTION INTRAVENOUS ONCE
Status: COMPLETED | OUTPATIENT
Start: 2024-11-24 | End: 2024-11-24

## 2024-11-24 RX ORDER — CALCIUM GLUCONATE 94 MG/ML
2000 INJECTION, SOLUTION INTRAVENOUS ONCE
Status: DISCONTINUED | OUTPATIENT
Start: 2024-11-24 | End: 2024-11-24 | Stop reason: HOSPADM

## 2024-11-24 RX ADMIN — CALCIUM GLUCONATE 1000 MG: 20 INJECTION, SOLUTION INTRAVENOUS at 08:03

## 2024-11-24 RX ADMIN — Medication 50 MEQ: at 07:54

## 2024-11-24 RX ADMIN — SODIUM CHLORIDE 1000 ML: 9 INJECTION, SOLUTION INTRAVENOUS at 06:15

## 2024-11-24 RX ADMIN — SODIUM BICARBONATE 50 MEQ: 84 INJECTION INTRAVENOUS at 07:54

## 2024-11-24 NOTE — ED NOTES
MD aware unable to get Oral/Axillary/Rectal temp on pt, temp does not take. Verbal to place temp sensing quigley, attempted to place on Pt but unsuccessful, appears to be getting stuck by prostate and unable to advance, placed pt on [employer] [place], MD aware states is okay, assuming pt is cold rather than febrile.     Filerman, Brandon, TAD  11/24/24 5839

## 2024-11-24 NOTE — ED NOTES
Patient discharged back to facility with hospice care. The patient was fully alert and arousable at discharge.      Mariel Watts RN  11/24/24 0840

## 2024-11-24 NOTE — ED PROVIDER NOTES
[facility]  EMERGENCY DEPARTMENT ENCOUNTER          ATTENDING PHYSICIAN NOTE       Date of evaluation: 11/24/2024    Chief Complaint     Altered Mental Status (Pt to ED by [facility] from [facility] for c/o AMS, states normally pt answers \"Yes or No to questions\" but this morning about an hour PTA states pt was not even answering questions. Per EMS en route pt had agonal respirations and systolic dropped to 50s and HR dropped to 20s and started bagging pt with BVM and pt had improvement in V/S. Pt responsive to painful stimuli raising arm when sternal rubbed, pt having snoring respirations at this time, MD at bedside. Pt has DNR CC)      History of Present Illness     Alen Araya is a 69 y.o. male who presents to the department for altered mental status.  Patient has a history of dementia and per nursing home is able to answer some yes and no questions.  Nursing home staff rounded on the patient in the morning and noted that he was more altered than usual.  EMS was called and on their arrival the patient had decreased respiratory effort and was hypotension.  EMS states that during their assessment the patient had change in status where his color became very pale and his heart rate went into the 20s.  They did bag the patient briefly and transported him to the emergency department.  Patient is DNR CC and per chart review has a history of end-stage renal disease and was previously on dialysis but electively discontinued dialysis 3 years ago.  On arrival, patient has sonorous respirations and only minimally responds to sternal rub.    ASSESSMENT / PLAN  (MEDICAL DECISION MAKING)     INITIAL VITALS: BP: (!) 59/37,  , Pulse: 61, Respirations: 14, SpO2: 100 %      Alen Araya is a 69 y.o. male with history of end-stage renal disease not currently on dialysis presents for altered mental status.  Patient was found altered at his nursing facility with an unclear last known normal.  He is relatively

## 2024-11-24 NOTE — ED NOTES
Unable to get axillary or oral temperature, will turn pt to get rectal temp, pt skin feels cool to touch, warm blankets applied to pt in meantime.     Filerman, Brandon, RN  11/24/24 5865

## 2024-11-24 NOTE — ED PROVIDER NOTES
[facility]  EMERGENCY DEPARTMENT ENCOUNTER          ATTENDING PHYSICIAN NOTE       Date of evaluation: 11/24/2024    ADDENDUM:      Care of this patient was assumed from the overnight provider pending reassessment, follow-up on or diagnostic studies, and seeking additional information from family.  The patient was seen for Altered Mental Status (Pt to ED by [facility] from [facility] for c/o AMS, states normally pt answers \"Yes or No to questions\" but this morning about an hour PTA states pt was not even answering questions. Per EMS en route pt had agonal respirations and systolic dropped to 50s and HR dropped to 20s and started bagging pt with BVM and pt had improvement in V/S. Pt responsive to painful stimuli raising arm when sternal rubbed, pt having snoring respirations at this time, MD at bedside. Pt has DNR CC)  .  The patient's initial evaluation and plan have been discussed with the prior provider who initially evaluated the patient.  Nursing Notes, Past Medical Hx, Past Surgical Hx, Social Hx, Allergies, and Family Hx were all reviewed.    On my evaluation, he is comfortable.  He has a adequate strength regular radial pulse at roughly 60.  No increased work of breathing or respiratory distress.  Tacky mucous membranes.  Lying supine.  Abdomen soft and nondistended.      Diagnostic Results     EKG   Normal sinus rhythm, wide QRS at 122, wide QTc at 491, rate is 62.  Mildly peaked T waves in the precordium.  No ST segment changes.  Interpretation: Sinus rhythm concerning for hyperkalemia.    RADIOLOGY:  CT HEAD WO CONTRAST   Final Result      No evidence of acute intracranial abnormality.               Electronically signed by Donald Little MD      XR CHEST PORTABLE   Final Result      No acute disease      Electronically signed by Donald Little MD          LABS:   Results for orders placed or performed during the hospital encounter of 11/24/24   CBC with Auto Differential   Result

## (undated) DEVICE — STERILE PVP: Brand: MEDLINE INDUSTRIES, INC.

## (undated) DEVICE — SURE SET-DOUBLE BASIN-LF: Brand: MEDLINE INDUSTRIES, INC.

## (undated) DEVICE — GLOVE ORANGE PI 7   MSG9070

## (undated) DEVICE — COVER LT HNDL BLU PLAS

## (undated) DEVICE — 3M™ WARMING BLANKET, UPPER BODY, 10 PER CASE, 42268: Brand: BAIR HUGGER™

## (undated) DEVICE — PEEL-AWAY HOOD: Brand: FLYTE, SURGICOOL

## (undated) DEVICE — GLOVE SURG SZ 85 L12IN FNGR THK13MIL BRN LTX SYN POLYMER W

## (undated) DEVICE — SUTURE NONABSORBABLE MONOFILAMENT 7-0 BV-1 1X24 IN PROLENE 8702H

## (undated) DEVICE — Z CONVERTED USE 2271043 CONTAINER SPEC COLL 4OZ SCR ON LID PEEL PCH

## (undated) DEVICE — GLOVE SURG SZ 6 L12IN FNGR THK79MIL GRN LTX FREE

## (undated) DEVICE — EVERGRIP INSERT SET: Brand: FOGARTY EVERGRIP

## (undated) DEVICE — CATHETER URETH 16FR BLLN 5CC 2 W F SPEC M OLV COUDE TIP

## (undated) DEVICE — Device

## (undated) DEVICE — PACK,CYSTOSCOPY,PK VIII: Brand: MEDLINE

## (undated) DEVICE — NEEDLE SUT SZ 3 MAYO 1 2 CIR TAPR PNT DISPOSABLE

## (undated) DEVICE — SUTURE VCRL + SZ 3-0 L27IN ABSRB UD L26MM SH 1/2 CIR VCP416H

## (undated) DEVICE — SUTURE MCRYL SZ 4-0 L18IN ABSRB UD L19MM PS-2 3/8 CIR PRIM Y496G

## (undated) DEVICE — SYRINGE MED 30ML STD CLR PLAS LUERLOCK TIP N CTRL DISP

## (undated) DEVICE — E-Z CLEAN, NON-STICK, PTFE COATED, ELECTROSURGICAL BLADE ELECTRODE, 2.5 INCH (6.35 CM): Brand: EZ CLEAN

## (undated) DEVICE — INTENDED TO BE USED TO OCCLUDE, RETRACT AND IDENTIFY ARTERIES, VEINS, TENDONS AND NERVES IN SURGICAL PROCEDURES: Brand: STERION®  VESSEL LOOP

## (undated) DEVICE — GOWN SIRUS NONREIN LG W/TWL: Brand: MEDLINE INDUSTRIES, INC.

## (undated) DEVICE — 3M™ STERI-DRAPE™ INSTRUMENT POUCH 1018: Brand: STERI-DRAPE™

## (undated) DEVICE — DRESSING THERABOND 3D ANTIMIC CNTCT SYS 15INCHX10INCH

## (undated) DEVICE — GLOVE SURG SZ 8 L11.77IN FNGR THK9.8MIL STRW LTX POLYMER

## (undated) DEVICE — SUTURE NONABSORBABLE MONOFILAMENT 6-0 BV-1 1X30 IN PROLENE 8709H

## (undated) DEVICE — GUIDEWIRE URO L150CM DIA0.035IN PTFE NIT HYDRPHLC ANG TIP

## (undated) DEVICE — 1010 S-DRAPE TOWEL DRAPE 10/BX: Brand: STERI-DRAPE™

## (undated) DEVICE — E-Z CLEAN, NON-STICK, PTFE COATED, ELECTROSURGICAL BLADE ELECTRODE, MODIFIED EXTENDED INSULATION, 6.5 INCH (16.5 CM): Brand: MEGADYNE

## (undated) DEVICE — SYRINGE IRRIG 60ML SFT PLIABLE BLB EZ TO GRP 1 HND USE W/

## (undated) DEVICE — TURNOVER KIT RM INF CTRL TECH

## (undated) DEVICE — PREP TRAY 10X5X2: Brand: MEDLINE INDUSTRIES, INC.

## (undated) DEVICE — JELLY LUBE BTL MDS032275 4OZ

## (undated) DEVICE — GLOVE SURG SZ 6 THK91MIL LTX FREE SYN POLYISOPRENE ANTI

## (undated) DEVICE — PREVENA INCISION MANAGEMENT SYSTEM- PEEL & PLACE DRESSING: Brand: PREVENA™ PEEL & PLACE™

## (undated) DEVICE — MEDI-VAC YANKAUER SUCTION HANDLE: Brand: CARDINAL HEALTH

## (undated) DEVICE — GLOVE SURG SZ 85 L12IN FNGR THK79MIL GRN LTX FREE

## (undated) DEVICE — GOWN,SIRUS,POLYRNF,BRTHSLV,XL,30/CS: Brand: MEDLINE

## (undated) DEVICE — SUTURE VCRL SZ 0 L18IN ABSRB UD L36MM CT-1 1/2 CIR J840D

## (undated) DEVICE — NEEDLE SPNL L3.5IN PNK HUB S STL REG WALL FIT STYL W/ QNCKE

## (undated) DEVICE — CATHETER URETH 18FR BLLN 5CC SIL HYDRGEL 2 W F SPEC CARS M

## (undated) DEVICE — SUTURE VCRL SZ 2-0 L18IN ABSRB UD CT-1 L36MM 1/2 CIR J839D

## (undated) DEVICE — BIPOLAR SEALER 23-112-1 AQM 6.0: Brand: AQUAMANTYS ®

## (undated) DEVICE — SUTURE MCRYL SZ 4-0 L27IN ABSRB UD L19MM PS-2 1/2 CIR PRIM Y426H

## (undated) DEVICE — DECANTER BAG 9": Brand: MEDLINE INDUSTRIES, INC.

## (undated) DEVICE — PUNCH AORT DIA4MM LNG HNDL

## (undated) DEVICE — 3M™ TEGADERM™ TRANSPARENT FILM DRESSING FRAME STYLE, 1627, 4 IN X 10 IN (10 CM X 25 CM), 20/CT 4CT/CASE: Brand: 3M™ TEGADERM™

## (undated) DEVICE — COAXIAL HIGH FLOW TIP WITH SOFT SHIELD

## (undated) DEVICE — GARMENT,MEDLINE,DVT,INT,CALF,MED, GEN2: Brand: MEDLINE

## (undated) DEVICE — GOWN SIRUS NONREIN XL W/TWL: Brand: MEDLINE INDUSTRIES, INC.

## (undated) DEVICE — PACK PROCEDURE SURG TOT HIP

## (undated) DEVICE — DRAPE C ARM UNIV W41XL74IN CLR PLAS XR VELC CLSR POLY STRP

## (undated) DEVICE — TUBING IRRIG L77IN DIA0.241IN L BOR FOR CYSTO W/ NVENT

## (undated) DEVICE — SUTURE ETHBND EXCEL SZ 5 L30IN NONABSORBABLE GRN L40MM V-37 MB66G

## (undated) DEVICE — URETHRAL DILATION / ACCESS SET: Brand: COOK

## (undated) DEVICE — PLATE ES AD W 9FT CRD 2

## (undated) DEVICE — SUTURE VCRL SZ 1 L18IN ABSRB UD L36MM CT-1 1/2 CIR J841D

## (undated) DEVICE — BLADE SAW RECIP HVY DUTY LNG 27796327] STRYKER CORP]

## (undated) DEVICE — TOWEL,OR,DSP,ST,WHITE,DLX,4/PK,20PK/CS: Brand: MEDLINE

## (undated) DEVICE — FOGARTY - HYDRAGRIP SURGICAL - CLAMP INSERTS: Brand: FOGARTY SOFTJAW

## (undated) DEVICE — SUTURE STRATAFIX SPRL SZ 1 L14IN ABSRB VLT L48CM CTX 1/2 SXPD2B405

## (undated) DEVICE — GAUZE,SPONGE,4"X4",16PLY,XRAY,STRL,LF: Brand: MEDLINE

## (undated) DEVICE — CATHETER URETH 14FR BLLN 5CC SIL HYDRGEL 2 W F SPEC CARS M

## (undated) DEVICE — SOLUTION IV IRRIG POUR BRL 0.9% SODIUM CHL 2F7124

## (undated) DEVICE — ORTHO PRE OP PACK: Brand: MEDLINE INDUSTRIES, INC.

## (undated) DEVICE — Z INACTIVE NO SUPPLIER SOLUTIONIRRIG 3000ML 0.9% SOD CHL FLX CONT [79720808] [HOSPIRA WORLDWIDE INC]

## (undated) DEVICE — PROTECTOR ULN NRV PUR FOAM HK LOOP STRP ANATOMICALLY

## (undated) DEVICE — APPLICATOR PREP 26ML 0.7% IOD POVACRYLEX 74% ISO ALC ST

## (undated) DEVICE — HANDPIECE SUCTION TUBING INTERPULSE 10FT

## (undated) DEVICE — GOWN,SIRUS,POLYRNF,BRTHSLV,LG,30/CS: Brand: MEDLINE

## (undated) DEVICE — SHEET,DRAPE,53X77,STERILE: Brand: MEDLINE

## (undated) DEVICE — CATHETER ETER IV 20GA L125IN POLYUR STR RADPQ INTROCAN SFTY

## (undated) DEVICE — SYRINGE MED 10ML POLYPR LUERSLIP TIP FLAT TOP W/O SFTY DISP

## (undated) DEVICE — BLADE ES L4IN INSUL EDGE

## (undated) DEVICE — 3M™ COBAN™ NL STERILE NON-LATEX SELF-ADHERENT WRAP, 2086S, 6 IN X 5 YD (15 CM X 4,5 M), 12 ROLLS/CASE: Brand: 3M™ COBAN™

## (undated) DEVICE — 3M™ STERI-DRAPE™ U-DRAPE 1015: Brand: STERI-DRAPE™

## (undated) DEVICE — TOWEL,OR,DSP,ST,BLUE,DLX,8/PK,10PK/CS: Brand: MEDLINE